# Patient Record
Sex: MALE | Race: WHITE | URBAN - METROPOLITAN AREA
[De-identification: names, ages, dates, MRNs, and addresses within clinical notes are randomized per-mention and may not be internally consistent; named-entity substitution may affect disease eponyms.]

---

## 2017-04-25 ENCOUNTER — ANESTHESIA EVENT (OUTPATIENT)
Dept: SURGERY | Facility: CLINIC | Age: 82
End: 2017-04-25
Payer: MEDICARE

## 2017-04-25 ENCOUNTER — HOSPITAL ENCOUNTER (OUTPATIENT)
Facility: CLINIC | Age: 82
Discharge: HOME | End: 2017-04-25
Attending: OPHTHALMOLOGY | Admitting: OPHTHALMOLOGY
Payer: MEDICARE

## 2017-04-25 ENCOUNTER — SURGERY (OUTPATIENT)
Age: 82
End: 2017-04-25

## 2017-04-25 ENCOUNTER — ANESTHESIA (OUTPATIENT)
Dept: SURGERY | Facility: CLINIC | Age: 82
End: 2017-04-25
Payer: MEDICARE

## 2017-04-25 VITALS
SYSTOLIC BLOOD PRESSURE: 105 MMHG | RESPIRATION RATE: 16 BRPM | DIASTOLIC BLOOD PRESSURE: 75 MMHG | OXYGEN SATURATION: 98 % | TEMPERATURE: 98.5 F

## 2017-04-25 PROCEDURE — 25000128 H RX IP 250 OP 636: Performed by: OPHTHALMOLOGY

## 2017-04-25 PROCEDURE — 25000132 ZZH RX MED GY IP 250 OP 250 PS 637: Performed by: OPHTHALMOLOGY

## 2017-04-25 PROCEDURE — 25000125 ZZHC RX 250: Performed by: ANESTHESIOLOGY

## 2017-04-25 PROCEDURE — 25800025 ZZH RX 258: Performed by: ANESTHESIOLOGY

## 2017-04-25 PROCEDURE — 36000101 ZZH EYE SURGERY LEVEL 3 1ST 30 MIN: Performed by: OPHTHALMOLOGY

## 2017-04-25 PROCEDURE — V2632 POST CHMBR INTRAOCULAR LENS: HCPCS | Performed by: OPHTHALMOLOGY

## 2017-04-25 PROCEDURE — 25000128 H RX IP 250 OP 636: Performed by: NURSE ANESTHETIST, CERTIFIED REGISTERED

## 2017-04-25 PROCEDURE — 71000028 ZZH EYE RECOVERY PHASE 2 EACH 15 MINS: Performed by: OPHTHALMOLOGY

## 2017-04-25 PROCEDURE — 25000125 ZZHC RX 250: Performed by: OPHTHALMOLOGY

## 2017-04-25 PROCEDURE — 37000009 ZZH ANESTHESIA TECHNICAL FEE, EACH ADDTL 15 MIN: Performed by: OPHTHALMOLOGY

## 2017-04-25 PROCEDURE — 40000170 ZZH STATISTIC PRE-PROCEDURE ASSESSMENT II: Performed by: OPHTHALMOLOGY

## 2017-04-25 PROCEDURE — 25000125 ZZHC RX 250: Performed by: NURSE ANESTHETIST, CERTIFIED REGISTERED

## 2017-04-25 PROCEDURE — 37000008 ZZH ANESTHESIA TECHNICAL FEE, 1ST 30 MIN: Performed by: OPHTHALMOLOGY

## 2017-04-25 PROCEDURE — 27210794 ZZH OR GENERAL SUPPLY STERILE: Performed by: OPHTHALMOLOGY

## 2017-04-25 PROCEDURE — 36000102 ZZH EYE SURGERY LEVEL 3 EA 15 ADDTL MIN: Performed by: OPHTHALMOLOGY

## 2017-04-25 PROCEDURE — 25000125 ZZHC RX 250

## 2017-04-25 DEVICE — EYE IMP IOL AMO PCL TECNIS ZCB00 21.0: Type: IMPLANTABLE DEVICE | Site: EYE | Status: FUNCTIONAL

## 2017-04-25 RX ORDER — NEOMYCIN SULFATE, POLYMYXIN B SULFATE, AND DEXAMETHASONE 3.5; 10000; 1 MG/G; [USP'U]/G; MG/G
OINTMENT OPHTHALMIC PRN
Status: DISCONTINUED | OUTPATIENT
Start: 2017-04-25 | End: 2017-04-25 | Stop reason: HOSPADM

## 2017-04-25 RX ORDER — MOXIFLOXACIN 5 MG/ML
1 SOLUTION/ DROPS OPHTHALMIC
Status: COMPLETED | OUTPATIENT
Start: 2017-04-25 | End: 2017-04-25

## 2017-04-25 RX ORDER — SODIUM CHLORIDE, SODIUM LACTATE, POTASSIUM CHLORIDE, CALCIUM CHLORIDE 600; 310; 30; 20 MG/100ML; MG/100ML; MG/100ML; MG/100ML
INJECTION, SOLUTION INTRAVENOUS CONTINUOUS
Status: CANCELLED | OUTPATIENT
Start: 2017-04-25

## 2017-04-25 RX ORDER — TROPICAMIDE 10 MG/ML
1 SOLUTION/ DROPS OPHTHALMIC
Status: COMPLETED | OUTPATIENT
Start: 2017-04-25 | End: 2017-04-25

## 2017-04-25 RX ORDER — SODIUM CHLORIDE, SODIUM LACTATE, POTASSIUM CHLORIDE, CALCIUM CHLORIDE 600; 310; 30; 20 MG/100ML; MG/100ML; MG/100ML; MG/100ML
500 INJECTION, SOLUTION INTRAVENOUS CONTINUOUS
Status: DISCONTINUED | OUTPATIENT
Start: 2017-04-25 | End: 2017-04-25 | Stop reason: HOSPADM

## 2017-04-25 RX ORDER — BALANCED SALT SOLUTION 6.4; .75; .48; .3; 3.9; 1.7 MG/ML; MG/ML; MG/ML; MG/ML; MG/ML; MG/ML
SOLUTION OPHTHALMIC PRN
Status: DISCONTINUED | OUTPATIENT
Start: 2017-04-25 | End: 2017-04-25 | Stop reason: HOSPADM

## 2017-04-25 RX ORDER — DICLOFENAC SODIUM 1 MG/ML
1 SOLUTION/ DROPS OPHTHALMIC
Status: COMPLETED | OUTPATIENT
Start: 2017-04-25 | End: 2017-04-25

## 2017-04-25 RX ORDER — LIDOCAINE HYDROCHLORIDE 10 MG/ML
INJECTION, SOLUTION EPIDURAL; INFILTRATION; INTRACAUDAL; PERINEURAL PRN
Status: DISCONTINUED | OUTPATIENT
Start: 2017-04-25 | End: 2017-04-25 | Stop reason: HOSPADM

## 2017-04-25 RX ORDER — PROPARACAINE HYDROCHLORIDE 5 MG/ML
1 SOLUTION/ DROPS OPHTHALMIC ONCE
Status: DISCONTINUED | OUTPATIENT
Start: 2017-04-25 | End: 2017-04-25 | Stop reason: HOSPADM

## 2017-04-25 RX ORDER — MOXIFLOXACIN 5 MG/ML
SOLUTION/ DROPS OPHTHALMIC PRN
Status: DISCONTINUED | OUTPATIENT
Start: 2017-04-25 | End: 2017-04-25 | Stop reason: HOSPADM

## 2017-04-25 RX ORDER — ONDANSETRON 2 MG/ML
INJECTION INTRAMUSCULAR; INTRAVENOUS PRN
Status: DISCONTINUED | OUTPATIENT
Start: 2017-04-25 | End: 2017-04-25

## 2017-04-25 RX ORDER — PHENYLEPHRINE HYDROCHLORIDE 25 MG/ML
1 SOLUTION/ DROPS OPHTHALMIC
Status: COMPLETED | OUTPATIENT
Start: 2017-04-25 | End: 2017-04-25

## 2017-04-25 RX ORDER — PROPARACAINE HYDROCHLORIDE 5 MG/ML
1 SOLUTION/ DROPS OPHTHALMIC ONCE
Status: COMPLETED | OUTPATIENT
Start: 2017-04-25 | End: 2017-04-25

## 2017-04-25 RX ORDER — FENTANYL CITRATE 50 UG/ML
INJECTION, SOLUTION INTRAMUSCULAR; INTRAVENOUS PRN
Status: DISCONTINUED | OUTPATIENT
Start: 2017-04-25 | End: 2017-04-25

## 2017-04-25 RX ADMIN — PROPARACAINE HYDROCHLORIDE 1 DROP: 5 SOLUTION/ DROPS OPHTHALMIC at 06:09

## 2017-04-25 RX ADMIN — LIDOCAINE HYDROCHLORIDE 0.5 ML: 35 GEL OPHTHALMIC at 07:25

## 2017-04-25 RX ADMIN — MOXIFLOXACIN HYDROCHLORIDE 1 DROP: 5 SOLUTION/ DROPS OPHTHALMIC at 08:05

## 2017-04-25 RX ADMIN — DICLOFENAC SODIUM 1 DROP: 1 SOLUTION/ DROPS OPHTHALMIC at 06:20

## 2017-04-25 RX ADMIN — DICLOFENAC SODIUM 1 DROP: 1 SOLUTION/ DROPS OPHTHALMIC at 06:15

## 2017-04-25 RX ADMIN — EPINEPHRINE 500 ML: 1 INJECTION, SOLUTION, CONCENTRATE INTRAVENOUS at 07:44

## 2017-04-25 RX ADMIN — BALANCED SALT SOLUTION 15 ML: 6.4; .75; .48; .3; 3.9; 1.7 SOLUTION OPHTHALMIC at 07:42

## 2017-04-25 RX ADMIN — DEXMEDETOMIDINE 4 MCG: 100 INJECTION, SOLUTION, CONCENTRATE INTRAVENOUS at 07:43

## 2017-04-25 RX ADMIN — TROPICAMIDE 1 DROP: 10 SOLUTION/ DROPS OPHTHALMIC at 06:20

## 2017-04-25 RX ADMIN — LIDOCAINE HYDROCHLORIDE 1 ML: 10 INJECTION, SOLUTION EPIDURAL; INFILTRATION; INTRACAUDAL; PERINEURAL at 06:21

## 2017-04-25 RX ADMIN — APRACLONIDINE HYDROCHLORIDE 1 DROP: 10 SOLUTION/ DROPS OPHTHALMIC at 08:06

## 2017-04-25 RX ADMIN — PHENYLEPHRINE HYDROCHLORIDE 1 DROP: 2.5 SOLUTION/ DROPS OPHTHALMIC at 06:20

## 2017-04-25 RX ADMIN — SODIUM CHLORIDE, POTASSIUM CHLORIDE, SODIUM LACTATE AND CALCIUM CHLORIDE 500 ML: 600; 310; 30; 20 INJECTION, SOLUTION INTRAVENOUS at 06:22

## 2017-04-25 RX ADMIN — MOXIFLOXACIN HYDROCHLORIDE 1 DROP: 5 SOLUTION/ DROPS OPHTHALMIC at 06:20

## 2017-04-25 RX ADMIN — FENTANYL CITRATE 50 MCG: 50 INJECTION, SOLUTION INTRAMUSCULAR; INTRAVENOUS at 07:33

## 2017-04-25 RX ADMIN — DICLOFENAC SODIUM 1 DROP: 1 SOLUTION/ DROPS OPHTHALMIC at 06:09

## 2017-04-25 RX ADMIN — TROPICAMIDE 1 DROP: 10 SOLUTION/ DROPS OPHTHALMIC at 06:09

## 2017-04-25 RX ADMIN — PHENYLEPHRINE HYDROCHLORIDE 1 DROP: 2.5 SOLUTION/ DROPS OPHTHALMIC at 06:15

## 2017-04-25 RX ADMIN — ONDANSETRON 4 MG: 2 INJECTION INTRAMUSCULAR; INTRAVENOUS at 07:42

## 2017-04-25 RX ADMIN — MOXIFLOXACIN HYDROCHLORIDE 1 DROP: 5 SOLUTION/ DROPS OPHTHALMIC at 06:09

## 2017-04-25 RX ADMIN — PHENYLEPHRINE HYDROCHLORIDE 1 DROP: 2.5 SOLUTION/ DROPS OPHTHALMIC at 06:09

## 2017-04-25 RX ADMIN — NEOMYCIN SULFATE, POLYMYXIN B SULFATE, AND DEXAMETHASONE 1 G: 3.5; 10000; 1 OINTMENT OPHTHALMIC at 08:06

## 2017-04-25 RX ADMIN — MOXIFLOXACIN HYDROCHLORIDE 1 DROP: 5 SOLUTION/ DROPS OPHTHALMIC at 06:15

## 2017-04-25 RX ADMIN — TROPICAMIDE 1 DROP: 10 SOLUTION/ DROPS OPHTHALMIC at 06:15

## 2017-04-25 RX ADMIN — FENTANYL CITRATE 25 MCG: 50 INJECTION, SOLUTION INTRAMUSCULAR; INTRAVENOUS at 07:43

## 2017-04-25 RX ADMIN — LIDOCAINE HYDROCHLORIDE 1 ML: 10 INJECTION, SOLUTION EPIDURAL; INFILTRATION; INTRACAUDAL; PERINEURAL at 07:44

## 2017-04-25 RX ADMIN — DEXMEDETOMIDINE 8 MCG: 100 INJECTION, SOLUTION, CONCENTRATE INTRAVENOUS at 07:37

## 2017-04-25 RX ADMIN — DEXMEDETOMIDINE 8 MCG: 100 INJECTION, SOLUTION, CONCENTRATE INTRAVENOUS at 07:33

## 2017-04-25 RX ADMIN — SODIUM CHONDROITIN SULFATE / SODIUM HYALURONATE 1 ML: 0.55-0.5 INJECTION INTRAOCULAR at 07:44

## 2017-04-25 ASSESSMENT — COPD QUESTIONNAIRES: COPD: 0

## 2017-04-25 ASSESSMENT — ENCOUNTER SYMPTOMS: SEIZURES: 0

## 2017-04-25 NOTE — IP AVS SNAPSHOT
[facility]    [address]    [place] [address]    Phone:  [phone]    Fax:  [phone]                                       After Visit Summary   4/25/2017    Saul Chavarria III    MRN: [id number]           After Visit Summary Signature Page     I have received my discharge instructions, and my questions have been answered. I have discussed any challenges I see with this plan with the nurse or doctor.    ..........................................................................................................................................  Patient/Patient Representative Signature      ..........................................................................................................................................  Patient Representative Print Name and Relationship to Patient    ..................................................               ................................................  Date                                            Time    ..........................................................................................................................................  Reviewed by Signature/Title    ...................................................              ..............................................  Date                                                            Time

## 2017-04-25 NOTE — IP AVS SNAPSHOT
MRN:[id number]                      After Visit Summary   4/25/2017    Saul Chavarria III    MRN: [id number]           Thank you!     Thank you for choosing [facility] for your care. Our goal is always to provide you with excellent care. Hearing back from our patients is one way we can continue to improve our services. Please take a few minutes to complete the written survey that you may receive in the mail after you visit with us. Thank you!        Patient Information     Date Of Birth          1935        About your hospital stay     You were admitted on:  April 25, 2017 You last received care in the:  [facility]    You were discharged on:  April 25, 2017       Who to Call     For medical emergencies, please call 911.  For non-urgent questions about your medical care, please call your primary care provider or clinic, [phone]  For questions related to your surgery, please call your surgery clinic        Attending Provider     Provider Crow Álvarez MD Ophthalmology       Primary Care Provider Office Phone # Fax #    [facility] [phone] [address] [facility] [address]        Further instructions from your care team        POST-OPERATIVE CARE FOLLOWING CATARACT SURGERY    CROW CALDERON M.D.  [facility]  [phone]        If you have a gauze patch and plastic eye shield on, please do not remove it until it is removed by your physician at your first post-operative visit.  You will not start your drops until after having your patch removed by your physician unless instructed otherwise.      Wear the eye shield when sleeping for protection for one week after surgery.      Do not rub the operated eye.      Light sensitivity may be noticed. Sunglasses may be worn for comfort.      Some discomfort and irritation may be noticed. Acetaminophen (Tylenol) or Ibuprofen (Advil) may be taken for  discomfort.      Avoid bending over, strenuous activity or heavy lifting (30 pounds or more) for one week.      No make-up or creams/lotions around the eye for two weeks.      You may wash your hair, bathe or shower, but keep the operated eye closed while doing so.  No swimming for three weeks.      Use medication exactly as prescribed by your doctor.  You may restart your regular home medications.      Bring all materials and medications to the clinic on your first post-operative visit.      Call the doctor s office if any of the following should occur:  -  any sudden vision change  -  increased amount of floaters (black spots in front of vision) or flashes  -  nausea, severe headache, or eye pain          -  or signs of infection (pus, increasing redness or tenderness)          [facility] Discharge  Instructions  Anesthesia ([facility] Center)   Adult Discharge Instructions    For 24 hours after surgery    1. Get plenty of rest.  Make arrangements to have a responsible adult stay with you for at least 6 hours after you leave the hospital.  2. Do not drive or use heavy equipment for 24 hours.    3. Do not drink alcohol for 24 hours.  4. Do not sign legal documents or make important decisions for 24 hours.  5. Avoid strenuous or risky activities. You may feel lightheaded.  If so, sit for a few minutes before standing.  Have someone help you get up.   6. Conscious sedation patients may resume a regular diet..  7. Any questions of medical nature, call your physician.    Pending Results     No orders found from 4/23/2017 to 4/26/2017.            Admission Information     Date & Time Provider Department Dept. Phone    4/25/2017 Wanda Jerez MD [facility] [place] [phone]      Your Vitals Were     Blood Pressure Temperature Respirations Pulse Oximetry          127/85 98.5  F (36.9  C) (Temporal) 16 98%        [employer] Information     [employer] lets you send messages to  "your doctor, view your test results, renew your prescriptions, schedule appointments and more. To sign up, go to [email or web address] . Click on \"Log in\" on the left side of the screen, which will take you to the Welcome page. Then click on \"Sign up Now\" on the right side of the page.     You will be asked to enter the access code listed below, as well as some personal information. Please follow the directions to create your username and password.     Your access code is: [email or web address]  Expires: 2017  8:11 AM     Your access code will  in 90 days. If you need help or a new code, please call your [place] clinic or [phone].        Care EveryWhere ID     This is your Care EveryWhere ID. This could be used by other organizations to access your [place] medical records  [id number]           Review of your medicines      UNREVIEWED medicines. Ask your doctor about these medicines        Dose / Directions    LISINOPRIL PO        Dose:  10 mg   Take 10 mg by mouth daily   Refills:  0                Protect others around you: Learn how to safely use, store and throw away your medicines at www.disposemymeds.org.             Medication List: This is a list of all your medications and when to take them. Check marks below indicate your daily home schedule. Keep this list as a reference.      Medications           Morning Afternoon Evening Bedtime As Needed    LISINOPRIL PO   Take 10 mg by mouth daily                                  "

## 2017-04-25 NOTE — ANESTHESIA CARE TRANSFER NOTE
Patient: Saul Chavarrai III    Procedure(s):  RIGHT EYE PHACOEMULSIFICATION CLEAR CORNEA WITH STANDARD INTRAOCULAR LENS IMPLANT - Wound Class: I-Clean    Diagnosis: CATARACT  Diagnosis Additional Information: No value filed.    Anesthesia Type:   MAC     Note:  Airway :Room Air  Patient transferred to:PACU    Transferred to [facility] recovery room in recliner with armrests up, spontaneous respirations, O2 saturation >93% with oxygen via room air. All monitors and alarms on and functioning, clinically stable vital signs. Report given to recovery RN and questions answered. Patient alert and following verbal directions.    Electronically Signed By: ARUN Burciaga CRNA  April 25, 2017  8:10 AM

## 2017-04-25 NOTE — OP NOTE
PREOPERATIVE DIAGNOSIS:  Visually significant cataract, Right eye.       POSTOPERATIVE DIAGNOSIS:  Visually significant cataract, Right eye.       PROCEDURE:  Phacoemulsification with intraocular lens implant, Right eye.       ANESTHESIA:  Topical with monitored anesthesia care.       COMPLICATIONS:  None.       INDICATIONS:  Saul Chavarria III is a 81 year old patient complaining of gradual painless loss of vision in the Right  Eye over the last several months.  This interferes with his activities of daily living.  On examination he was found to have best-corrected visual acuity of 20/40 with a nuclear sclerotic and cortical cataract. After careful discussion regarding the risks, benefits and alternatives of  cataract surgery with intraocular lens implant, the patient elected to proceed, and informed written consent was obtained.        PROCEDURE:  On the day of surgery, the patient was identified in the preoperative waiting area.  An interval history was obtained and was unremarkable for change.  All sources indicated that the Right eye was the correct surgical site and a isha placed above the Right eye.  A drop of proparacaine followed by Betadine and lidocaine jelly were placed on the ocular surface.         The patient was escorted to the operating room where monitored intravenous sedation was administered.  The eye was prepped and draped in the usual sterile fashion for ophthalmic surgery and a lid speculum placed.  After confirming adequate anesthesia, a paracentesis port was made at the 10 o'clock position and used to instill intracameral lidocaine into the anterior chamber, followed by viscoelastic.  A temporal clear corneal wound was fashioned first with a crescent blade followed by a 2.5 mm keratome.  An anterior capsulorrhexis was made by raising a flap with a cystotome and then carrying out the flap in a continuous curvilinear fashion using Utrata forceps.  Hydrodissection was accomplished with  balanced salt solution on a flat tipped cannula.  Phaco-emulsification was done in a modified stop and chop technique, removing the nucleus without complication.  Residual cortex was removed with the automated irrigation and aspiration handpiece.  The back of the polymer tip was used to gently polish the posterior capsule.  The capsular bag was then reinflated with Provisc and a 21.0 diopter TIFFANI lens model ZCBOO was placed in the capsular bag using the Kilgore injector.    The residual viscoelastic was removed with the automated irrigation and aspiration handpiece.  The lens was well centered and the pupil round. ReSure tissue adhesive was used to re-oppose the edges of the clear corneal wound. All wounds were checked and found to be watertight. Vigamox solution was instilled into the anterior chamber via the paracentesis port. A drop of Betadine, followed by Iopodine solution  and Maxitrol ointment were placed on the ocular surface and the eye patched and shielded.  The patient was escorted to the recovery room in good condition having tolerated the procedure well.  There were no complications.      Implant Name Type Inv. Item Serial No.  Lot No. LRB No. Used   EYE IMP IOL TIFFANI PCL TECNIS ZCB00 21.0 Lens/Eye Implant EYE IMP IOL TIFFANI PCL TECNIS ZCB00 21.0 [id number] [employer]   Right 1            CROW CALDERON MD

## 2017-04-25 NOTE — DISCHARGE INSTRUCTIONS
POST-OPERATIVE CARE FOLLOWING CATARACT SURGERY    CROW CALDERON M.D.  [facility]  [phone]        If you have a gauze patch and plastic eye shield on, please do not remove it until it is removed by your physician at your first post-operative visit.  You will not start your drops until after having your patch removed by your physician unless instructed otherwise.      Wear the eye shield when sleeping for protection for one week after surgery.      Do not rub the operated eye.      Light sensitivity may be noticed. Sunglasses may be worn for comfort.      Some discomfort and irritation may be noticed. Acetaminophen (Tylenol) or Ibuprofen (Advil) may be taken for discomfort.      Avoid bending over, strenuous activity or heavy lifting (30 pounds or more) for one week.      No make-up or creams/lotions around the eye for two weeks.      You may wash your hair, bathe or shower, but keep the operated eye closed while doing so.  No swimming for three weeks.      Use medication exactly as prescribed by your doctor.  You may restart your regular home medications.      Bring all materials and medications to the clinic on your first post-operative visit.      Call the doctor s office if any of the following should occur:  -  any sudden vision change  -  increased amount of floaters (black spots in front of vision) or flashes  -  nausea, severe headache, or eye pain          -  or signs of infection (pus, increasing redness or tenderness)          [facility] Discharge  Instructions  Anesthesia ([facility] Center)   Adult Discharge Instructions    For 24 hours after surgery    1. Get plenty of rest.  Make arrangements to have a responsible adult stay with you for at least 6 hours after you leave the hospital.  2. Do not drive or use heavy equipment for 24 hours.    3. Do not drink alcohol for 24 hours.  4. Do not sign legal documents or make important decisions for 24  hours.  5. Avoid strenuous or risky activities. You may feel lightheaded.  If so, sit for a few minutes before standing.  Have someone help you get up.   6. Conscious sedation patients may resume a regular diet..  7. Any questions of medical nature, call your physician.

## 2017-05-24 ENCOUNTER — OFFICE VISIT (OUTPATIENT)
Dept: SURGERY | Facility: CLINIC | Age: 82
End: 2017-05-24
Payer: MEDICARE

## 2017-05-24 VITALS
DIASTOLIC BLOOD PRESSURE: 97 MMHG | HEIGHT: 71 IN | SYSTOLIC BLOOD PRESSURE: 169 MMHG | WEIGHT: 200 LBS | BODY MASS INDEX: 28 KG/M2 | HEART RATE: 71 BPM

## 2017-05-24 DIAGNOSIS — K40.90 RIGHT INGUINAL HERNIA: Primary | ICD-10-CM

## 2017-05-24 PROCEDURE — 99204 OFFICE O/P NEW MOD 45 MIN: CPT | Performed by: SURGERY

## 2017-05-24 NOTE — LETTER
"      May 24, 2017    [facility]  Surgery Consultation    RE:  Saul Chavarria III-:  35    CONSULTATION REQUESTED BY:  Lina Tijerina [phone]     HPI: Patient is a 81 year old male who presents with right inguinal hernia. Hernia has been present for last 6 months. Pain is primarily located in the RLQ. Patient states pain is worse with excercise heavy lifting standing. Pain is rated as mild. Symptoms are improved with rest lying flat. Hernia has not been incarcerated. Patient has not had any symptoms of bowel obstruction. Patient denies fevers, chills, nausea, vomiting, SOB, chest pain,.     PMH:   has a past medical history of Anemia; Hypertension; Prostate cancer (H); Rosacea; and Sleep apnea.  PSH:    has a past surgical history that includes colonoscopy; Abdomen surgery; ostatectomy perineal; and Phacoemulsification clear cornea with standard intraocular lens implant (Right, 2017).  Social History:   reports that he quit smoking about 59 years ago. His smoking use included Cigarettes. He does not have any smokeless tobacco history on file. He reports that he drinks alcohol. He reports that he does not use illicit drugs.  Family History:  family history is not on file.  Medications/Allergies: Home medications and allergies reviewed.     ROS:  The 10 point Review of Systems is negative other than noted in the HPI.     Physical Exam:  BP (!) 169/97  Pulse 71  Ht 5' 11\" (1.803 m)  Wt 200 lb (90.7 kg)  BMI 27.89 kg/m2  GENERAL: Generally appears well.  Psych: Alert and Oriented.  Normal affect  Eyes: Sclera clear  Respiratory:  Lungs clear to ausculation bilaterally with good air excursion  Cardiovascular:  Regular Rate and Rhythm with no murmurs gallops or rubs, normal peripheral pulses  GI: Abdomen Non Distended Non-Tender  No hernias palpated..  Groin- I examined the patient in both the standing and supine positions. Right Groin- Moderate sized inguinal hernia. Left " Groin- No hernia Palpated. No scrotal or testicle abnormalities.  Lymphatic/Hematologic/Immune:  No femoral or cervical lymphadenopathy.  Integumentary:  No rashes  Neurological: grossly intact      All new lab and imaging data was reviewed.      Impression and Plan:  Patient is a 81 year old male with Right inguinal hernia     PLAN: Outpatient  Repair to be scheduled at his convenience  I discussed the pathophysiology of hernias and options for repair including laparoscopic VS open.  The risks associated with the procedure including, but not limited to, recurrence, nerve entrapment or injury, persistence of pain, injury to the bowel/bladder, infertility, hematoma, mesh migration, mesh infection, MI, and PE were discussed with the patient. He indicated understanding of the discussion, asked appropriate questions, and provided consent. Signs and symptoms of incarceration were discussed. If these develop in the interim, he promises to call or go straight to the ER. I have provided the patient with an information pamphlet.      Thank you very much for this consult.     Ravinder Gomez M.D.  [facility]  [phone]

## 2017-05-24 NOTE — MR AVS SNAPSHOT
"              After Visit Summary   2017    Saul Chavarria III    MRN: [id number]           Patient Information     Date Of Birth          1935        Visit Information        Provider Department      2017 2:00 PM Ravinder Gomez MD [facility] [place] [facility]       Follow-ups after your visit        Who to contact     If you have questions or need follow up information about today's clinic visit or your schedule please contact [facility] [place] directly at [phone].  Normal or non-critical lab and imaging results will be communicated to you by [employer], letter or phone within 4 business days after the clinic has received the results. If you do not hear from us within 7 days, please contact the clinic through [employer] or phone. If you have a critical or abnormal lab result, we will notify you by phone as soon as possible.  Submit refill requests through [email or web address] or call your pharmacy and they will forward the refill request to us. Please allow 3 business days for your refill to be completed.          Additional Information About Your Visit        MyChart Information     [email or web address] lets you send messages to your doctor, view your test results, renew your prescriptions, schedule appointments and more. To sign up, go to [email or web address] . Click on \"Log in\" on the left side of the screen, which will take you to the Welcome page. Then click on \"Sign up Now\" on the right side of the page.     You will be asked to enter the access code listed below, as well as some personal information. Please follow the directions to create your username and password.     Your access code is: [email or web address]  Expires: 2017  8:11 AM     Your access code will  in 90 days. If you need help or a new code, please call your [place] clinic or [phone].        Care EveryWhere ID     This is your Care EveryWhere ID. This could be used by other " "organizations to access your [place] medical records  [id number]        Your Vitals Were     Pulse Height BMI (Body Mass Index)             71 5' 11\" (1.803 m) 27.89 kg/m2          Blood Pressure from Last 3 Encounters:   05/24/17 (!) 169/97   04/25/17 105/75    Weight from Last 3 Encounters:   05/24/17 200 lb (90.7 kg)              Today, you had the following     No orders found for display       Primary Care Provider Office Phone # Fax #    Lina Tijerina [phone]       [employer] [address]  [facility] [address]        Thank you!     Thank you for choosing [facility] [place]  for your care. Our goal is always to provide you with excellent care. Hearing back from our patients is one way we can continue to improve our services. Please take a few minutes to complete the written survey that you may receive in the mail after your visit with us. Thank you!             Your Updated Medication List - Protect others around you: Learn how to safely use, store and throw away your medicines at www.disposemymeds.org.          This list is accurate as of: 5/24/17  2:50 PM.  Always use your most recent med list.                   Brand Name Dispense Instructions for use    LISINOPRIL PO      Take 10 mg by mouth daily         "

## 2017-05-31 NOTE — PROGRESS NOTES
"[facility]  Surgery Consultation    CONSULTATION REQUESTED BY:  Lina Tijerina [phone]    HPI: Patient is a 81 year old male who presents with right inguinal hernia. Hernia has been present for last 6 months. Pain is primarily located in the RLQ. Patient states pain is worse with excercise heavy lifting standing. Pain is rated as mild. Symptoms are improved with rest lying flat. Hernia has not been incarcerated. Patient has not had any symptoms of bowel obstruction. Patient denies fevers, chills, nausea, vomiting, SOB, chest pain,.    PMH:   has a past medical history of Anemia; Hypertension; Prostate cancer (H); Rosacea; and Sleep apnea.  PSH:    has a past surgical history that includes colonoscopy; Abdomen surgery; ostatectomy perineal; and Phacoemulsification clear cornea with standard intraocular lens implant (Right, 4/25/2017).  Social History:   reports that he quit smoking about 59 years ago. His smoking use included Cigarettes. He does not have any smokeless tobacco history on file. He reports that he drinks alcohol. He reports that he does not use illicit drugs.  Family History:  family history is not on file.  Medications/Allergies: Home medications and allergies reviewed.    ROS:  The 10 point Review of Systems is negative other than noted in the HPI.    Physical Exam:  BP (!) 169/97  Pulse 71  Ht 5' 11\" (1.803 m)  Wt 200 lb (90.7 kg)  BMI 27.89 kg/m2  GENERAL: Generally appears well.  Psych: Alert and Oriented.  Normal affect  Eyes: Sclera clear  Respiratory:  Lungs clear to ausculation bilaterally with good air excursion  Cardiovascular:  Regular Rate and Rhythm with no murmurs gallops or rubs, normal peripheral pulses  GI: Abdomen Non Distended Non-Tender  No hernias palpated..  Groin- I examined the patient in both the standing and supine positions. Right Groin- Moderate sized inguinal hernia. Left Groin- No hernia Palpated. No scrotal or testicle " abnormalities.  Lymphatic/Hematologic/Immune:  No femoral or cervical lymphadenopathy.  Integumentary:  No rashes  Neurological: grossly intact     All new lab and imaging data was reviewed.     Impression and Plan:  Patient is a 81 year old male with Right inguinal hernia    PLAN: Outpatient  Repair to be scheduled at his convenience  I discussed the pathophysiology of hernias and options for repair including laparoscopic VS open.  The risks associated with the procedure including, but not limited to, recurrence, nerve entrapment or injury, persistence of pain, injury to the bowel/bladder, infertility, hematoma, mesh migration, mesh infection, MI, and PE were discussed with the patient. He indicated understanding of the discussion, asked appropriate questions, and provided consent. Signs and symptoms of incarceration were discussed. If these develop in the interim, he promises to call or go straight to the ER. I have provided the patient with an information pamphlet.      Thank you very much for this consult.    Ravinder Gomez M.D.  [facility]  [phone]    Please route or send letter to:  Primary Care Provider (PCP) and Referring Provider

## 2017-06-27 ENCOUNTER — HOSPITAL ENCOUNTER (OUTPATIENT)
Facility: CLINIC | Age: 82
Discharge: HOME | End: 2017-06-27
Attending: SURGERY | Admitting: SURGERY
Payer: MEDICARE

## 2017-06-27 ENCOUNTER — ANESTHESIA (OUTPATIENT)
Dept: SURGERY | Facility: CLINIC | Age: 82
End: 2017-06-27
Payer: MEDICARE

## 2017-06-27 ENCOUNTER — ANESTHESIA EVENT (OUTPATIENT)
Dept: SURGERY | Facility: CLINIC | Age: 82
End: 2017-06-27
Payer: MEDICARE

## 2017-06-27 ENCOUNTER — SURGERY (OUTPATIENT)
Age: 82
End: 2017-06-27

## 2017-06-27 ENCOUNTER — APPOINTMENT (OUTPATIENT)
Dept: SURGERY | Facility: PHYSICIAN GROUP | Age: 82
End: 2017-06-27
Payer: MEDICARE

## 2017-06-27 VITALS
OXYGEN SATURATION: 98 % | TEMPERATURE: 97.8 F | HEIGHT: 70 IN | BODY MASS INDEX: 28.06 KG/M2 | DIASTOLIC BLOOD PRESSURE: 60 MMHG | RESPIRATION RATE: 18 BRPM | SYSTOLIC BLOOD PRESSURE: 116 MMHG | WEIGHT: 196 LBS

## 2017-06-27 DIAGNOSIS — K40.90 RIGHT INGUINAL HERNIA: Primary | ICD-10-CM

## 2017-06-27 DIAGNOSIS — G89.18 ACUTE POST-OPERATIVE PAIN: ICD-10-CM

## 2017-06-27 PROCEDURE — 25000128 H RX IP 250 OP 636: Performed by: SURGERY

## 2017-06-27 PROCEDURE — 49505 PRP I/HERN INIT REDUC >5 YR: CPT | Performed by: SURGERY

## 2017-06-27 PROCEDURE — 71000013 ZZH RECOVERY PHASE 1 LEVEL 1 EA ADDTL HR: Performed by: SURGERY

## 2017-06-27 PROCEDURE — 27210995 ZZH RX 272: Performed by: SURGERY

## 2017-06-27 PROCEDURE — 36000052 ZZH SURGERY LEVEL 2 EA 15 ADDTL MIN: Performed by: SURGERY

## 2017-06-27 PROCEDURE — 40000170 ZZH STATISTIC PRE-PROCEDURE ASSESSMENT II: Performed by: SURGERY

## 2017-06-27 PROCEDURE — 49505 PRP I/HERN INIT REDUC >5 YR: CPT | Mod: AS | Performed by: PHYSICIAN ASSISTANT

## 2017-06-27 PROCEDURE — 25000566 ZZH SEVOFLURANE, EA 15 MIN: Performed by: SURGERY

## 2017-06-27 PROCEDURE — 36000050 ZZH SURGERY LEVEL 2 1ST 30 MIN: Performed by: SURGERY

## 2017-06-27 PROCEDURE — 71000027 ZZH RECOVERY PHASE 2 EACH 15 MINS: Performed by: SURGERY

## 2017-06-27 PROCEDURE — 25000128 H RX IP 250 OP 636: Performed by: NURSE ANESTHETIST, CERTIFIED REGISTERED

## 2017-06-27 PROCEDURE — C1781 MESH (IMPLANTABLE): HCPCS | Performed by: SURGERY

## 2017-06-27 PROCEDURE — 25000125 ZZHC RX 250: Performed by: NURSE ANESTHETIST, CERTIFIED REGISTERED

## 2017-06-27 PROCEDURE — 71000012 ZZH RECOVERY PHASE 1 LEVEL 1 FIRST HR: Performed by: SURGERY

## 2017-06-27 PROCEDURE — 37000008 ZZH ANESTHESIA TECHNICAL FEE, 1ST 30 MIN: Performed by: SURGERY

## 2017-06-27 PROCEDURE — 37000009 ZZH ANESTHESIA TECHNICAL FEE, EACH ADDTL 15 MIN: Performed by: SURGERY

## 2017-06-27 PROCEDURE — 25000125 ZZHC RX 250: Performed by: SURGERY

## 2017-06-27 PROCEDURE — 27210794 ZZH OR GENERAL SUPPLY STERILE: Performed by: SURGERY

## 2017-06-27 DEVICE — MESH ULTRAPRO HERNIA 2.4X4.7" LARGE UHSL: Type: IMPLANTABLE DEVICE | Site: INGUINAL | Status: FUNCTIONAL

## 2017-06-27 RX ORDER — FENTANYL CITRATE 50 UG/ML
INJECTION, SOLUTION INTRAMUSCULAR; INTRAVENOUS PRN
Status: DISCONTINUED | OUTPATIENT
Start: 2017-06-27 | End: 2017-06-27

## 2017-06-27 RX ORDER — EPHEDRINE SULFATE 50 MG/ML
INJECTION, SOLUTION INTRAMUSCULAR; INTRAVENOUS; SUBCUTANEOUS PRN
Status: DISCONTINUED | OUTPATIENT
Start: 2017-06-27 | End: 2017-06-27

## 2017-06-27 RX ORDER — SODIUM CHLORIDE, SODIUM LACTATE, POTASSIUM CHLORIDE, CALCIUM CHLORIDE 600; 310; 30; 20 MG/100ML; MG/100ML; MG/100ML; MG/100ML
INJECTION, SOLUTION INTRAVENOUS CONTINUOUS
Status: DISCONTINUED | OUTPATIENT
Start: 2017-06-27 | End: 2017-06-27 | Stop reason: HOSPADM

## 2017-06-27 RX ORDER — FENTANYL CITRATE 0.05 MG/ML
25-50 INJECTION, SOLUTION INTRAMUSCULAR; INTRAVENOUS EVERY 5 MIN PRN
Status: DISCONTINUED | OUTPATIENT
Start: 2017-06-27 | End: 2017-06-27 | Stop reason: HOSPADM

## 2017-06-27 RX ORDER — NALOXONE HYDROCHLORIDE 0.4 MG/ML
.1-.4 INJECTION, SOLUTION INTRAMUSCULAR; INTRAVENOUS; SUBCUTANEOUS
Status: DISCONTINUED | OUTPATIENT
Start: 2017-06-27 | End: 2017-06-27 | Stop reason: HOSPADM

## 2017-06-27 RX ORDER — LABETALOL HYDROCHLORIDE 5 MG/ML
10 INJECTION, SOLUTION INTRAVENOUS
Status: DISCONTINUED | OUTPATIENT
Start: 2017-06-27 | End: 2017-06-27 | Stop reason: HOSPADM

## 2017-06-27 RX ORDER — PROPOFOL 10 MG/ML
INJECTION, EMULSION INTRAVENOUS PRN
Status: DISCONTINUED | OUTPATIENT
Start: 2017-06-27 | End: 2017-06-27

## 2017-06-27 RX ORDER — FENTANYL CITRATE 0.05 MG/ML
25-50 INJECTION, SOLUTION INTRAMUSCULAR; INTRAVENOUS
Status: DISCONTINUED | OUTPATIENT
Start: 2017-06-27 | End: 2017-06-27 | Stop reason: HOSPADM

## 2017-06-27 RX ORDER — HYDROCODONE BITARTRATE AND ACETAMINOPHEN 5; 325 MG/1; MG/1
1-2 TABLET ORAL
Status: DISCONTINUED | OUTPATIENT
Start: 2017-06-27 | End: 2017-06-27 | Stop reason: HOSPADM

## 2017-06-27 RX ORDER — MEPERIDINE HYDROCHLORIDE 25 MG/ML
12.5 INJECTION INTRAMUSCULAR; INTRAVENOUS; SUBCUTANEOUS
Status: DISCONTINUED | OUTPATIENT
Start: 2017-06-27 | End: 2017-06-27 | Stop reason: HOSPADM

## 2017-06-27 RX ORDER — SODIUM CHLORIDE, SODIUM LACTATE, POTASSIUM CHLORIDE, CALCIUM CHLORIDE 600; 310; 30; 20 MG/100ML; MG/100ML; MG/100ML; MG/100ML
INJECTION, SOLUTION INTRAVENOUS CONTINUOUS PRN
Status: DISCONTINUED | OUTPATIENT
Start: 2017-06-27 | End: 2017-06-27

## 2017-06-27 RX ORDER — CEFAZOLIN SODIUM 2 G/100ML
2 INJECTION, SOLUTION INTRAVENOUS
Status: COMPLETED | OUTPATIENT
Start: 2017-06-27 | End: 2017-06-27

## 2017-06-27 RX ORDER — HYDROCODONE BITARTRATE AND ACETAMINOPHEN 5; 325 MG/1; MG/1
1-2 TABLET ORAL EVERY 4 HOURS PRN
Qty: 25 TABLET | Refills: 0 | Status: SHIPPED | OUTPATIENT
Start: 2017-06-27 | End: 2017-07-11

## 2017-06-27 RX ORDER — ONDANSETRON 4 MG/1
4 TABLET, ORALLY DISINTEGRATING ORAL EVERY 30 MIN PRN
Status: DISCONTINUED | OUTPATIENT
Start: 2017-06-27 | End: 2017-06-27 | Stop reason: HOSPADM

## 2017-06-27 RX ORDER — MAGNESIUM HYDROXIDE 1200 MG/15ML
LIQUID ORAL PRN
Status: DISCONTINUED | OUTPATIENT
Start: 2017-06-27 | End: 2017-06-27 | Stop reason: HOSPADM

## 2017-06-27 RX ORDER — ONDANSETRON 2 MG/ML
4 INJECTION INTRAMUSCULAR; INTRAVENOUS EVERY 30 MIN PRN
Status: DISCONTINUED | OUTPATIENT
Start: 2017-06-27 | End: 2017-06-27 | Stop reason: HOSPADM

## 2017-06-27 RX ORDER — LIDOCAINE HYDROCHLORIDE 20 MG/ML
INJECTION, SOLUTION INFILTRATION; PERINEURAL PRN
Status: DISCONTINUED | OUTPATIENT
Start: 2017-06-27 | End: 2017-06-27

## 2017-06-27 RX ORDER — CEFAZOLIN SODIUM 1 G/3ML
1 INJECTION, POWDER, FOR SOLUTION INTRAMUSCULAR; INTRAVENOUS SEE ADMIN INSTRUCTIONS
Status: DISCONTINUED | OUTPATIENT
Start: 2017-06-27 | End: 2017-06-27 | Stop reason: HOSPADM

## 2017-06-27 RX ORDER — ONDANSETRON 2 MG/ML
INJECTION INTRAMUSCULAR; INTRAVENOUS PRN
Status: DISCONTINUED | OUTPATIENT
Start: 2017-06-27 | End: 2017-06-27

## 2017-06-27 RX ORDER — BUPIVACAINE HYDROCHLORIDE AND EPINEPHRINE 5; 5 MG/ML; UG/ML
INJECTION, SOLUTION PERINEURAL PRN
Status: DISCONTINUED | OUTPATIENT
Start: 2017-06-27 | End: 2017-06-27 | Stop reason: HOSPADM

## 2017-06-27 RX ADMIN — SODIUM CHLORIDE, POTASSIUM CHLORIDE, SODIUM LACTATE AND CALCIUM CHLORIDE: 600; 310; 30; 20 INJECTION, SOLUTION INTRAVENOUS at 10:48

## 2017-06-27 RX ADMIN — BUPIVACAINE HYDROCHLORIDE AND EPINEPHRINE BITARTRATE 30 ML: 5; .005 INJECTION, SOLUTION PERINEURAL at 11:24

## 2017-06-27 RX ADMIN — Medication 5 MG: at 11:19

## 2017-06-27 RX ADMIN — PROPOFOL 50 MG: 10 INJECTION, EMULSION INTRAVENOUS at 11:03

## 2017-06-27 RX ADMIN — Medication 5 MG: at 11:09

## 2017-06-27 RX ADMIN — LIDOCAINE HYDROCHLORIDE 100 MG: 20 INJECTION, SOLUTION INFILTRATION; PERINEURAL at 10:50

## 2017-06-27 RX ADMIN — PHENYLEPHRINE HYDROCHLORIDE 100 MCG: 10 INJECTION, SOLUTION INTRAMUSCULAR; INTRAVENOUS; SUBCUTANEOUS at 11:30

## 2017-06-27 RX ADMIN — PROPOFOL 150 MG: 10 INJECTION, EMULSION INTRAVENOUS at 10:50

## 2017-06-27 RX ADMIN — ONDANSETRON 4 MG: 2 INJECTION INTRAMUSCULAR; INTRAVENOUS at 11:32

## 2017-06-27 RX ADMIN — FENTANYL CITRATE 25 MCG: 50 INJECTION, SOLUTION INTRAMUSCULAR; INTRAVENOUS at 10:50

## 2017-06-27 RX ADMIN — SODIUM CHLORIDE 1000 ML: 0.9 IRRIGANT IRRIGATION at 11:24

## 2017-06-27 RX ADMIN — FENTANYL CITRATE 75 MCG: 50 INJECTION, SOLUTION INTRAMUSCULAR; INTRAVENOUS at 11:02

## 2017-06-27 RX ADMIN — Medication 5 MG: at 11:13

## 2017-06-27 RX ADMIN — CEFAZOLIN SODIUM 2 G: 2 INJECTION, SOLUTION INTRAVENOUS at 10:55

## 2017-06-27 RX ADMIN — PHENYLEPHRINE HYDROCHLORIDE 100 MCG: 10 INJECTION, SOLUTION INTRAMUSCULAR; INTRAVENOUS; SUBCUTANEOUS at 11:13

## 2017-06-27 RX ADMIN — PHENYLEPHRINE HYDROCHLORIDE 100 MCG: 10 INJECTION, SOLUTION INTRAMUSCULAR; INTRAVENOUS; SUBCUTANEOUS at 11:19

## 2017-06-27 RX ADMIN — Medication 5 MG: at 11:00

## 2017-06-27 RX ADMIN — Medication 5 MG: at 10:58

## 2017-06-27 ASSESSMENT — LIFESTYLE VARIABLES: TOBACCO_USE: 1

## 2017-06-27 NOTE — OP NOTE
PREOPERATIVE DIAGNOSIS: Right  inguinal hernia.   POSTOPERATIVE DIAGNOSIS: Right inguinal hernia.   PROCEDURE: Right  inguinal hernia repair with mesh.   SURGEON: Ravinder Gomez MD   ASSISTANT: Keshav Rodriges PA-C, Physician's assistant first assistant was necessary during this procedure due to expertise in patient positioning, prepping, incisional opening, retraction, exposure, and suctioning.  ANESTHESIA: General   ESTIMATED BLOOD LOSS: 10 mL.   DRAINS: None.   COMPLICATIONS: None.   SPECIMENS: None.   OPERATIVE INDICATIONS: Mr. Chavarria has a symptomatic Right  inguinal hernia. Options were discussed and it was elected to proceed with open repair. Potential risks of bleeding, infection, neurovascular injury to the vas deferens or testicle, recurrent hernia, chronic pain were all reviewed in detail and he wished to proceed.   DESCRIPTION OF PROCEDURE: After informed consent was obtained, the patient was taken to the operating room and placed supine on the operating table. Following the induction of adequate general anesthetic, the patient's Right  groin was shaved, prepped and draped in the usual fashion. A timeout was then completed and IV antibiotics were administered. A standard groin incision was then made and dissection was carried down to the external oblique fascia. This was sharply incised and the inguinal canal was exposed. The spermatic cord and its contents were mobilized and encircled with a Penrose drain. A moderate-sized indirect hernia was present. The sac was dissected from the cord and reduced. Preperitoneal dissection was then performed and the UltraPro Hernia System was introduced, the posterior portion was deployed in the preperitoneal space. The anterior mesh was then unrolled in the inguinal canal with lateral tail being placed up and underneath the fascia of the external oblique. A slit was made in the mesh to accommodate the spermatic cord and the mesh was tacked into position using  interrupted sutures of 2-0 Vicryl. The spermatic cord was then released and the fascia of the external oblique was closed using running 0 Vicryl stitch. The operative area was infiltrated with  local anesthetic. The deep subcutaneous was closed with 3-0 Vicryl stitch. Skin incision was closed with subcuticular 4-0 Monocryl stitch. Benzoin and Steri-Strips were applied. Needle and sponge counts were correct. The patient tolerated this well. He was awakened in the operating room and taken to recovery room in stable condition.   ARTIS ZIMMERMAN MD

## 2017-06-27 NOTE — IP AVS SNAPSHOT
MRN:[id number]                      After Visit Summary   6/27/2017    Saul Chavarria III    MRN: [id number]           Thank you!     Thank you for choosing [facility] for your care. Our goal is always to provide you with excellent care. Hearing back from our patients is one way we can continue to improve our services. Please take a few minutes to complete the written survey that you may receive in the mail after you visit with us. Thank you!        Patient Information     Date Of Birth          1935        About your hospital stay     You were admitted on:  June 27, 2017 You last received care in the:  [facility] PreOP/Phase II    You were discharged on:  June 27, 2017       Who to Call     For medical emergencies, please call 911.  For non-urgent questions about your medical care, please call your primary care provider or clinic, [phone]  For questions related to your surgery, please call your surgery clinic        Attending Provider     Provider Specialty    Ravinder Gomez MD General Surgery       Primary Care Provider Office Phone # Fax #    Lina Tijerina [phone]      Further instructions from your care team                                     DISCHARGE INSTRUCTIONS  Before your discharge from the hospital, your nurse will review the following instructions for your home care.  Please read this and ask your nurse or doctor any questions before your discharge.  WOUND CARE  You may remove your dressing the morning after surgery or if drainage seeps through it. You should shower without the dressing on and replace it after bathing if the wound is still draining or if it is more comfortable to have the wound dressed.  You may use guaze and tape or bandaids depending on the size of the wound and your preference.  ACTIVITY  You may climb stairs.  You may exercise if you are comfortable.  You may drive without restrictions when you are not using  prescription pain medication and are comfortable in the car.  You may return to work / school when you are comfortable without prescription pain medication.  BATHING  You may shower.  Do not soak the wound or swim until ten days after surgery and only if the incision has been dry for 2-3 days.  You may have steri-strips (looks like tape) on your incision.  They will fall off by themselves.  DIET  Return to the diet you were on before surgery.  CALL YOUR PHYSICIAN IF YOU HAVE  Chills or fever above 101  F.  Significant or malodorous drainage from the incision(s)   Significant bleeding  Pain not relieved by your pain medication or rest.  Increasing pain after the first 48 hours  HELPFUL HINTS  Prescription pain medications make most people constipated.  It is never a bad idea to take a mild laxative (Miralax / Milk of magnesia) while you are using your prescription medication.  You may take ibuprofen instead of or in addition to your prescription.  If you are taking the maximum amount of your prescription medication, you should not take any additional Tylenol.  FOLLOW-UP  You should call to schedule a follow-up visit in about 2 weeks ([phone]).      Same Day Surgery Discharge Instructions for  Sedation and General Anesthesia       It's not unusual to feel dizzy, light-headed or faint for up to 24 hours after surgery or while taking pain medication.  If you have these symptoms: sit for a few minutes before standing and have someone assist you when you get up to walk or use the bathroom.      You should rest and relax for the next 24 hours. We recommend you make arrangements to have an adult stay with you for at least 24 hours after your discharge.  Avoid hazardous and strenuous activity.      DO NOT DRIVE any vehicle or operate mechanical equipment for 24 hours following the end of your surgery.  Even though you may feel normal, your reactions may be affected by the medication you have received.      Do not drink  "alcoholic beverages for 24 hours following surgery.       Slowly progress to your regular diet as you feel able. It's not unusual to feel nauseated and/or vomit after receiving anesthesia.  If you develop these symptoms, drink clear liquids (apple juice, ginger ale, broth, 7-up, etc. ) until you feel better.  If your nausea and vomiting persists for 24 hours, please notify your surgeon.        All narcotic pain medications, along with inactivity and anesthesia, can cause constipation. Drinking plenty of liquids and increasing fiber intake will help.      For any questions of a medical nature, call your surgeon.      Do not make important decisions for 24 hours.      If you had general anesthesia, you may have a sore throat for a couple of days related to the breathing tube used during surgery.  You may use Cepacol lozenges to help with this discomfort.  If it worsens or if you develop a fever, contact your surgeon.       If you feel your pain is not well managed with the pain medications prescribed by your surgeon, please contact your surgeon's office to let them know so they can address your concerns.           Pending Results     No orders found from 6/25/2017 to 6/28/2017.            Admission Information     Date & Time Provider Department Dept. Phone    6/27/2017 Ravinder Gomez MD [facility] PreOP/Phase [phone]      Your Vitals Were     Blood Pressure Temperature Respirations Height Weight Pulse Oximetry    112/74 97.8  F (36.6  C) (Tympanic) 20 1.778 m (5' 10\") 88.9 kg (196 lb) 91%    BMI (Body Mass Index)                   28.12 kg/m2           [employer] Information     [employer] lets you send messages to your doctor, view your test results, renew your prescriptions, schedule appointments and more. To sign up, go to [email or web address] . Click on \"Log in\" on the left side of the screen, which will take you to the Welcome page. Then click on \"Sign up Now\" on the right side of the " page.     You will be asked to enter the access code listed below, as well as some personal information. Please follow the directions to create your username and password.     Your access code is: [email or web address]  Expires: 2017  8:11 AM     Your access code will  in 90 days. If you need help or a new code, please call your [facility] clinic or [phone].        Care EveryWhere ID     This is your Care EveryWhere ID. This could be used by other organizations to access your [facility] medical records  [id number]        Equal Access to Services     [facility]: Hadii yessi Campa, walonnie pitts, qaasha kaalmamitul markham, marina gilbert . So [facility] [phone].    ATENCIÓN: Si habla español, tiene a ashley disposición servicios gratuitos de asistencia lingüística. Llame al [phone].    We comply with applicable federal civil rights laws and [place] laws. We do not discriminate on the basis of race, color, national origin, age, disability sex, sexual orientation or gender identity.               Review of your medicines      START taking        Dose / Directions    HYDROcodone-acetaminophen 5-325 MG per tablet   Commonly known as:  NORCO   Used for:  Right inguinal hernia, Acute post-operative pain        Dose:  1-2 tablet   Take 1-2 tablets by mouth every 4 hours as needed for other (Moderate to Severe Pain)   Quantity:  25 tablet   Refills:  0         CONTINUE these medicines which have NOT CHANGED        Dose / Directions    LISINOPRIL PO        Dose:  10 mg   Take 10 mg by mouth daily   Refills:  0            Where to get your medicines      Some of these will need a paper prescription and others can be bought over the counter. Ask your nurse if you have questions.     Bring a paper prescription for each of these medications     HYDROcodone-acetaminophen 5-325 MG per tablet                Protect others around you: Learn how to safely use, store and throw away your  medicines at www.disposemymeds.org.             Medication List: This is a list of all your medications and when to take them. Check marks below indicate your daily home schedule. Keep this list as a reference.      Medications           Morning Afternoon Evening Bedtime As Needed    HYDROcodone-acetaminophen 5-325 MG per tablet   Commonly known as:  NORCO   Take 1-2 tablets by mouth every 4 hours as needed for other (Moderate to Severe Pain)                                LISINOPRIL PO   Take 10 mg by mouth daily

## 2017-06-27 NOTE — IP AVS SNAPSHOT
[facility] PreOP/Phase II    [address]    [place] [address]    Phone:  [phone]                                       After Visit Summary   6/27/2017    Saul Chavarria III    MRN: [id number]           After Visit Summary Signature Page     I have received my discharge instructions, and my questions have been answered. I have discussed any challenges I see with this plan with the nurse or doctor.    ..........................................................................................................................................  Patient/Patient Representative Signature      ..........................................................................................................................................  Patient Representative Print Name and Relationship to Patient    ..................................................               ................................................  Date                                            Time    ..........................................................................................................................................  Reviewed by Signature/Title    ...................................................              ..............................................  Date                                                            Time

## 2017-06-27 NOTE — ANESTHESIA POSTPROCEDURE EVALUATION
Patient: Saul Chavarria III    Procedure(s):  OPEN RIGHT INGUINAL HERNIA REPAIR WITH MESH - Wound Class: I-Clean    Diagnosis:RIGHT INGUINAL HERNIA  Diagnosis Additional Information: No value filed.    Anesthesia Type:  General, LMA    Note:  Anesthesia Post Evaluation    Patient location during evaluation: PACU  Patient participation: Able to fully participate in evaluation  Level of consciousness: awake  Pain management: adequate  Airway patency: patent  Cardiovascular status: acceptable  Respiratory status: acceptable  Hydration status: acceptable  PONV: none     Anesthetic complications: None          Last vitals:  Vitals:    06/27/17 1215 06/27/17 1230 06/27/17 1245   BP: 126/69 117/72 112/74   Resp: 12 27 20   Temp:   36.6  C (97.8  F)   SpO2: 96% 97% 91%         Electronically Signed By: EDMUNDO FIGUEROA MD  June 27, 2017  1:12 PM

## 2017-06-27 NOTE — DISCHARGE INSTRUCTIONS
DISCHARGE INSTRUCTIONS  Before your discharge from the hospital, your nurse will review the following instructions for your home care.  Please read this and ask your nurse or doctor any questions before your discharge.  WOUND CARE  You may remove your dressing the morning after surgery or if drainage seeps through it. You should shower without the dressing on and replace it after bathing if the wound is still draining or if it is more comfortable to have the wound dressed.  You may use guaze and tape or bandaids depending on the size of the wound and your preference.  ACTIVITY  You may climb stairs.  You may exercise if you are comfortable.  You may drive without restrictions when you are not using prescription pain medication and are comfortable in the car.  You may return to work / school when you are comfortable without prescription pain medication.  BATHING  You may shower.  Do not soak the wound or swim until ten days after surgery and only if the incision has been dry for 2-3 days.  You may have steri-strips (looks like tape) on your incision.  They will fall off by themselves.  DIET  Return to the diet you were on before surgery.  CALL YOUR PHYSICIAN IF YOU HAVE  Chills or fever above 101  F.  Significant or malodorous drainage from the incision(s)   Significant bleeding  Pain not relieved by your pain medication or rest.  Increasing pain after the first 48 hours  HELPFUL HINTS  Prescription pain medications make most people constipated.  It is never a bad idea to take a mild laxative (Miralax / Milk of magnesia) while you are using your prescription medication.  You may take ibuprofen instead of or in addition to your prescription.  If you are taking the maximum amount of your prescription medication, you should not take any additional Tylenol.  FOLLOW-UP  You should call to schedule a follow-up visit in about 2 weeks ([phone]).      Same Day Surgery Discharge Instructions  for  Sedation and General Anesthesia       It's not unusual to feel dizzy, light-headed or faint for up to 24 hours after surgery or while taking pain medication.  If you have these symptoms: sit for a few minutes before standing and have someone assist you when you get up to walk or use the bathroom.      You should rest and relax for the next 24 hours. We recommend you make arrangements to have an adult stay with you for at least 24 hours after your discharge.  Avoid hazardous and strenuous activity.      DO NOT DRIVE any vehicle or operate mechanical equipment for 24 hours following the end of your surgery.  Even though you may feel normal, your reactions may be affected by the medication you have received.      Do not drink alcoholic beverages for 24 hours following surgery.       Slowly progress to your regular diet as you feel able. It's not unusual to feel nauseated and/or vomit after receiving anesthesia.  If you develop these symptoms, drink clear liquids (apple juice, ginger ale, broth, 7-up, etc. ) until you feel better.  If your nausea and vomiting persists for 24 hours, please notify your surgeon.        All narcotic pain medications, along with inactivity and anesthesia, can cause constipation. Drinking plenty of liquids and increasing fiber intake will help.      For any questions of a medical nature, call your surgeon.      Do not make important decisions for 24 hours.      If you had general anesthesia, you may have a sore throat for a couple of days related to the breathing tube used during surgery.  You may use Cepacol lozenges to help with this discomfort.  If it worsens or if you develop a fever, contact your surgeon.       If you feel your pain is not well managed with the pain medications prescribed by your surgeon, please contact your surgeon's office to let them know so they can address your concerns.

## 2017-06-27 NOTE — ANESTHESIA CARE TRANSFER NOTE
Patient: Saul Chavarria III    Procedure(s):  OPEN RIGHT INGUINAL HERNIA REPAIR WITH MESH - Wound Class: I-Clean    Diagnosis: RIGHT INGUINAL HERNIA  Diagnosis Additional Information: No value filed.    Anesthesia Type:   General, LMA     Note:  Airway :Face Mask  Patient transferred to:PACU  Comments: To hospitals PACU: Awake, good airway, 02 face mask, VSS  Report to RN      Vitals: (Last set prior to Anesthesia Care Transfer)    CRNA VITALS  6/27/2017 1108 - 6/27/2017 1144      6/27/2017             NIBP: 136/68    Pulse: 87    NIBP Mean: 88    SpO2: 100 %                Electronically Signed By: ARUN Lujan CRNA  June 27, 2017  11:44 AM

## 2017-06-27 NOTE — ANESTHESIA PREPROCEDURE EVALUATION
Anesthesia Evaluation     .             ROS/MED HX    ENT/Pulmonary:     (+)sleep apnea, tobacco use, Past use , . .    Neurologic:       Cardiovascular:     (+) hypertension----. : . . fainting (syncope). :. .       METS/Exercise Tolerance:     Hematologic:     (+) Anemia, -      Musculoskeletal:         GI/Hepatic:     (+) Other GI/Hepatic GI AVM's;     (-) GERD   Renal/Genitourinary:         Endo:         Psychiatric:         Infectious Disease:         Malignancy:   (+) Malignancy History of Prostate          Other:                     Physical Exam  Normal systems: cardiovascular, pulmonary and dental    Airway   Mallampati: II  TM distance: >3 FB  Neck ROM: full    Dental     Cardiovascular       Pulmonary                     Anesthesia Plan      History & Physical Review  History and physical reviewed and following examination; no interval change.    ASA Status:  2 .    NPO Status:  > 8 hours    Plan for General and LMA with Intravenous induction. Maintenance will be Balanced.    PONV prophylaxis:  Ondansetron (or other 5HT-3)       Postoperative Care  Postoperative pain management:  IV analgesics.      Consents  Anesthetic plan, risks, benefits and alternatives discussed with:  Patient..                          .

## 2017-06-30 ENCOUNTER — TELEPHONE (OUTPATIENT)
Dept: SURGERY | Facility: CLINIC | Age: 82
End: 2017-06-30

## 2017-06-30 NOTE — TELEPHONE ENCOUNTER
Attempted to call patient and do post op check with them , left VM with call back ph.# if they wished to call clinic back.    Stella Yo RN BSN

## 2017-07-11 ENCOUNTER — OFFICE VISIT (OUTPATIENT)
Dept: SURGERY | Facility: CLINIC | Age: 82
End: 2017-07-11
Payer: MEDICARE

## 2017-07-11 VITALS
DIASTOLIC BLOOD PRESSURE: 60 MMHG | SYSTOLIC BLOOD PRESSURE: 124 MMHG | WEIGHT: 196 LBS | BODY MASS INDEX: 28.06 KG/M2 | HEART RATE: 86 BPM | TEMPERATURE: 98.3 F | HEIGHT: 70 IN | OXYGEN SATURATION: 98 %

## 2017-07-11 DIAGNOSIS — Z09 SURGICAL FOLLOWUP VISIT: Primary | ICD-10-CM

## 2017-07-11 PROCEDURE — 99024 POSTOP FOLLOW-UP VISIT: CPT | Performed by: SURGERY

## 2017-07-11 NOTE — LETTER
2017    RE:  Saul Chavarria, III-:  35    Patient returns in follow-up after recent open right inguinal hernia repair with mesh.  He reports minimal pain location usage postoperatively.  Reports minimal residual discomfort this time.  He has resumed mostly normal activity.  Bowel function has returned to baseline.  Reports moderate swelling.     On examination: His incision is clean and intact.  There is modest postoperative swelling without evidence of recurrence     Overall progressing nicely.  We discussed advancement of activity.  May resume activities as comfortable.  At this point I believe he is doing well enough to follow up on an as-needed basis.  His questions were all answered to his satisfaction.    Ravinder Gomez MD

## 2017-07-11 NOTE — MR AVS SNAPSHOT
"              After Visit Summary   7/11/2017    Saul Chavarria III    MRN: [id number]           Patient Information     Date Of Birth          1935        Visit Information        Provider Department      7/11/2017 11:45 AM Ravinder Gomez MD [facility]      Today's Diagnoses     Surgical followup visit    -  1       Follow-ups after your visit        Your next 10 appointments already scheduled     Jul 11, 2017 11:45 AM CDT   Post Op with Ravinder Gomez MD   [facility] ([facility])    [address]  [facility] [id number] [phone]              Who to contact     If you have questions or need follow up information about today's clinic visit or your schedule please contact [facility] directly at [phone].  Normal or non-critical lab and imaging results will be communicated to you by MyChart, letter or phone within 4 business days after the clinic has received the results. If you do not hear from us within 7 days, please contact the clinic through [employer] or phone. If you have a critical or abnormal lab result, we will notify you by phone as soon as possible.  Submit refill requests through VFA or call your pharmacy and they will forward the refill request to us. Please allow 3 business days for your refill to be completed.          Additional Information About Your Visit        MyChart Information     VFA lets you send messages to your doctor, view your test results, renew your prescriptions, schedule appointments and more. To sign up, go to [email or web address] . Click on \"Log in\" on the left side of the screen, which will take you to the Welcome page. Then click on \"Sign up Now\" on the right side of the page.     You will be asked to enter the access code listed below, as well as some personal information. Please follow the directions to create " "your username and password.     Your access code is: [email or web address]  Expires: 2017  8:11 AM     Your access code will  in 90 days. If you need help or a new code, please call your [facility] or [phone].        Care EveryWhere ID     This is your Care EveryWhere ID. This could be used by other organizations to access your [place] medical records  [id number]        Your Vitals Were     Pulse Temperature Height Pulse Oximetry BMI (Body Mass Index)       86 98.3  F (36.8  C) (Oral) 5' 10\" (1.778 m) 98% 28.12 kg/m2        Blood Pressure from Last 3 Encounters:   17 124/60   17 116/60   17 (!) 169/97    Weight from Last 3 Encounters:   17 196 lb (88.9 kg)   17 196 lb (88.9 kg)   17 200 lb (90.7 kg)              Today, you had the following     No orders found for display       Primary Care Provider Office Phone # Fax #    Lina Tijerina [phone]       [facility] [address]  [facility] [address]        Equal Access to Services     MITCHELL PASCAL : Hadii yessi ku hadasho Soomaali, waaxda luqadaha, qaybta kaalmada adeegyada, waxay lenny haymonse gilbert . So [facility] [phone].    ATENCIÓN: Si habla español, tiene a ashley disposición servicios gratuitos de asistencia lingüística. bonifacio al [phone].    We comply with applicable federal civil rights laws and [place] laws. We do not discriminate on the basis of race, color, national origin, age, disability sex, sexual orientation or gender identity.            Thank you!     Thank you for choosing [facility]  for your care. Our goal is always to provide you with excellent care. Hearing back from our patients is one way we can continue to improve our services. Please take a few minutes to complete the written survey that you may receive in the mail after your visit with us. Thank you!             Your Updated Medication List - Protect others around you: Learn how to " safely use, store and throw away your medicines at www.disposemymeds.org.          This list is accurate as of: 7/11/17 11:43 AM.  Always use your most recent med list.                   Brand Name Dispense Instructions for use Diagnosis    LISINOPRIL PO      Take 10 mg by mouth daily

## 2017-07-13 NOTE — PROGRESS NOTES
Patient returns in follow-up after recent open right inguinal hernia repair with mesh.  He reports minimal pain location usage postoperatively.  Reports minimal residual discomfort this time.  He has resumed mostly normal activity.  Bowel function has returned to baseline.  Reports moderate swelling.    On examination: His incision is clean and intact.  There is modest postoperative swelling without evidence of recurrence    Overall progressing nicely.  We discussed advancement of activity.  May resume activities as comfortable.  At this point I believe he is doing well enough to follow up on an as-needed basis.  His questions were all answered to his satisfaction.    Please route or send letter to:  Primary Care Provider (PCP)

## 2018-07-14 ENCOUNTER — HOSPITAL ENCOUNTER (INPATIENT)
Facility: CLINIC | Age: 83
LOS: 3 days | Discharge: HOME | DRG: 389 | End: 2018-07-17
Attending: EMERGENCY MEDICINE | Admitting: INTERNAL MEDICINE
Payer: MEDICARE

## 2018-07-14 DIAGNOSIS — K56.609 SMALL BOWEL OBSTRUCTION (H): ICD-10-CM

## 2018-07-14 LAB
ALBUMIN SERPL-MCNC: 3.8 G/DL (ref 3.4–5)
ALP SERPL-CCNC: 55 U/L (ref 40–150)
ALT SERPL W P-5'-P-CCNC: 21 U/L (ref 0–70)
ANION GAP SERPL CALCULATED.3IONS-SCNC: 7 MMOL/L (ref 3–14)
AST SERPL W P-5'-P-CCNC: 21 U/L (ref 0–45)
BASOPHILS # BLD AUTO: 0 10E9/L (ref 0–0.2)
BASOPHILS NFR BLD AUTO: 0.1 %
BILIRUB SERPL-MCNC: 1.1 MG/DL (ref 0.2–1.3)
BUN SERPL-MCNC: 34 MG/DL (ref 7–30)
CALCIUM SERPL-MCNC: 8.6 MG/DL (ref 8.5–10.1)
CHLORIDE SERPL-SCNC: 104 MMOL/L (ref 94–109)
CO2 SERPL-SCNC: 29 MMOL/L (ref 20–32)
CREAT SERPL-MCNC: 1.34 MG/DL (ref 0.66–1.25)
DIFFERENTIAL METHOD BLD: ABNORMAL
EOSINOPHIL # BLD AUTO: 0 10E9/L (ref 0–0.7)
EOSINOPHIL NFR BLD AUTO: 0 %
ERYTHROCYTE [DISTWIDTH] IN BLOOD BY AUTOMATED COUNT: 13 % (ref 10–15)
GFR SERPL CREATININE-BSD FRML MDRD: 51 ML/MIN/1.7M2
GLUCOSE SERPL-MCNC: 111 MG/DL (ref 70–99)
HCT VFR BLD AUTO: 47.3 % (ref 40–53)
HGB BLD-MCNC: 16.5 G/DL (ref 13.3–17.7)
IMM GRANULOCYTES # BLD: 0 10E9/L (ref 0–0.4)
IMM GRANULOCYTES NFR BLD: 0.3 %
LACTATE BLD-SCNC: 0.8 MMOL/L (ref 0.4–1.9)
LACTATE BLD-SCNC: 1.4 MMOL/L (ref 0.7–2)
LIPASE SERPL-CCNC: 74 U/L (ref 73–393)
LYMPHOCYTES # BLD AUTO: 1.1 10E9/L (ref 0.8–5.3)
LYMPHOCYTES NFR BLD AUTO: 7.4 %
MCH RBC QN AUTO: 31.8 PG (ref 26.5–33)
MCHC RBC AUTO-ENTMCNC: 34.9 G/DL (ref 31.5–36.5)
MCV RBC AUTO: 91 FL (ref 78–100)
MONOCYTES # BLD AUTO: 1.3 10E9/L (ref 0–1.3)
MONOCYTES NFR BLD AUTO: 9.1 %
NEUTROPHILS # BLD AUTO: 12.2 10E9/L (ref 1.6–8.3)
NEUTROPHILS NFR BLD AUTO: 83.1 %
NRBC # BLD AUTO: 0 10*3/UL
NRBC BLD AUTO-RTO: 0 /100
PLATELET # BLD AUTO: 235 10E9/L (ref 150–450)
POTASSIUM SERPL-SCNC: 4.1 MMOL/L (ref 3.4–5.3)
PROT SERPL-MCNC: 7.3 G/DL (ref 6.8–8.8)
RBC # BLD AUTO: 5.19 10E12/L (ref 4.4–5.9)
SODIUM SERPL-SCNC: 140 MMOL/L (ref 133–144)
WBC # BLD AUTO: 14.7 10E9/L (ref 4–11)

## 2018-07-14 PROCEDURE — 83690 ASSAY OF LIPASE: CPT | Performed by: EMERGENCY MEDICINE

## 2018-07-14 PROCEDURE — 25000132 ZZH RX MED GY IP 250 OP 250 PS 637: Mod: GY | Performed by: INTERNAL MEDICINE

## 2018-07-14 PROCEDURE — 25000128 H RX IP 250 OP 636: Performed by: INTERNAL MEDICINE

## 2018-07-14 PROCEDURE — 85025 COMPLETE CBC W/AUTO DIFF WBC: CPT | Performed by: EMERGENCY MEDICINE

## 2018-07-14 PROCEDURE — 83605 ASSAY OF LACTIC ACID: CPT | Performed by: INTERNAL MEDICINE

## 2018-07-14 PROCEDURE — 12000007 ZZH R&B INTERMEDIATE

## 2018-07-14 PROCEDURE — 25000128 H RX IP 250 OP 636: Performed by: SURGERY

## 2018-07-14 PROCEDURE — 99222 1ST HOSP IP/OBS MODERATE 55: CPT | Performed by: SURGERY

## 2018-07-14 PROCEDURE — 99285 EMERGENCY DEPT VISIT HI MDM: CPT | Mod: 25

## 2018-07-14 PROCEDURE — 96374 THER/PROPH/DIAG INJ IV PUSH: CPT

## 2018-07-14 PROCEDURE — 25000128 H RX IP 250 OP 636: Performed by: EMERGENCY MEDICINE

## 2018-07-14 PROCEDURE — A9270 NON-COVERED ITEM OR SERVICE: HCPCS | Mod: GY | Performed by: INTERNAL MEDICINE

## 2018-07-14 PROCEDURE — 96375 TX/PRO/DX INJ NEW DRUG ADDON: CPT

## 2018-07-14 PROCEDURE — 99223 1ST HOSP IP/OBS HIGH 75: CPT | Mod: AI | Performed by: INTERNAL MEDICINE

## 2018-07-14 PROCEDURE — 80053 COMPREHEN METABOLIC PANEL: CPT | Performed by: EMERGENCY MEDICINE

## 2018-07-14 PROCEDURE — 36415 COLL VENOUS BLD VENIPUNCTURE: CPT | Performed by: INTERNAL MEDICINE

## 2018-07-14 PROCEDURE — 83605 ASSAY OF LACTIC ACID: CPT | Performed by: EMERGENCY MEDICINE

## 2018-07-14 RX ORDER — ONDANSETRON 2 MG/ML
4 INJECTION INTRAMUSCULAR; INTRAVENOUS EVERY 6 HOURS PRN
Status: DISCONTINUED | OUTPATIENT
Start: 2018-07-14 | End: 2018-07-17 | Stop reason: HOSPADM

## 2018-07-14 RX ORDER — HYDROMORPHONE HYDROCHLORIDE 1 MG/ML
.3-.5 INJECTION, SOLUTION INTRAMUSCULAR; INTRAVENOUS; SUBCUTANEOUS
Status: DISCONTINUED | OUTPATIENT
Start: 2018-07-14 | End: 2018-07-17 | Stop reason: HOSPADM

## 2018-07-14 RX ORDER — LISINOPRIL 10 MG/1
10 TABLET ORAL EVERY EVENING
Status: DISCONTINUED | OUTPATIENT
Start: 2018-07-14 | End: 2018-07-17 | Stop reason: HOSPADM

## 2018-07-14 RX ORDER — ONDANSETRON 2 MG/ML
4 INJECTION INTRAMUSCULAR; INTRAVENOUS ONCE
Status: COMPLETED | OUTPATIENT
Start: 2018-07-14 | End: 2018-07-14

## 2018-07-14 RX ORDER — ONDANSETRON 4 MG/1
4 TABLET, ORALLY DISINTEGRATING ORAL EVERY 6 HOURS PRN
Status: DISCONTINUED | OUTPATIENT
Start: 2018-07-14 | End: 2018-07-17 | Stop reason: HOSPADM

## 2018-07-14 RX ORDER — ACETAMINOPHEN 325 MG/1
325 TABLET ORAL EVERY 6 HOURS PRN
Status: DISCONTINUED | OUTPATIENT
Start: 2018-07-14 | End: 2018-07-17 | Stop reason: HOSPADM

## 2018-07-14 RX ORDER — OXYCODONE HYDROCHLORIDE 5 MG/1
5-10 TABLET ORAL
Status: DISCONTINUED | OUTPATIENT
Start: 2018-07-14 | End: 2018-07-17 | Stop reason: HOSPADM

## 2018-07-14 RX ORDER — SODIUM CHLORIDE 9 MG/ML
INJECTION, SOLUTION INTRAVENOUS CONTINUOUS
Status: DISCONTINUED | OUTPATIENT
Start: 2018-07-14 | End: 2018-07-17 | Stop reason: HOSPADM

## 2018-07-14 RX ORDER — NALOXONE HYDROCHLORIDE 0.4 MG/ML
.1-.4 INJECTION, SOLUTION INTRAMUSCULAR; INTRAVENOUS; SUBCUTANEOUS
Status: DISCONTINUED | OUTPATIENT
Start: 2018-07-14 | End: 2018-07-17 | Stop reason: HOSPADM

## 2018-07-14 RX ADMIN — Medication 0.5 MG: at 14:36

## 2018-07-14 RX ADMIN — Medication 0.5 MG: at 18:57

## 2018-07-14 RX ADMIN — SODIUM CHLORIDE: 9 INJECTION, SOLUTION INTRAVENOUS at 16:35

## 2018-07-14 RX ADMIN — Medication 0.5 MG: at 22:03

## 2018-07-14 RX ADMIN — ONDANSETRON 4 MG: 2 INJECTION INTRAMUSCULAR; INTRAVENOUS at 14:35

## 2018-07-14 RX ADMIN — LISINOPRIL 10 MG: 10 TABLET ORAL at 22:03

## 2018-07-14 RX ADMIN — ONDANSETRON 4 MG: 2 INJECTION INTRAMUSCULAR; INTRAVENOUS at 18:57

## 2018-07-14 RX ADMIN — SODIUM CHLORIDE 1000 ML: 9 INJECTION, SOLUTION INTRAVENOUS at 13:40

## 2018-07-14 ASSESSMENT — ENCOUNTER SYMPTOMS
NAUSEA: 1
FEVER: 0
ABDOMINAL PAIN: 1
CHILLS: 0
VOMITING: 1

## 2018-07-14 ASSESSMENT — PAIN DESCRIPTION - DESCRIPTORS
DESCRIPTORS: ACHING
DESCRIPTORS: CRAMPING

## 2018-07-14 NOTE — IP AVS SNAPSHOT
[address]    [facility] [address]    Phone:  [phone]                                       After Visit Summary   7/14/2018    Saul Chavarria III    MRN: [id number]           After Visit Summary Signature Page     I have received my discharge instructions, and my questions have been answered. I have discussed any challenges I see with this plan with the nurse or doctor.    ..........................................................................................................................................  Patient/Patient Representative Signature      ..........................................................................................................................................  Patient Representative Print Name and Relationship to Patient    ..................................................               ................................................  Date                                            Time    ..........................................................................................................................................  Reviewed by Signature/Title    ...................................................              ..............................................  Date                                                            Time

## 2018-07-14 NOTE — ED PROVIDER NOTES
History     Chief Complaint:  Abdominal Pain     HPI   Saul Chavarria III is a 82 year old male who presents for evaluation of abdominal pain. The patient was seen at [facility] in [place] yesterday and diagnosed with a partial small bowel obstruction with a transition point in the right mid abdomen. The radiologist's report in the CT recommended surgical consultation, however the patient reports that he wanted to come back to the [facility] to continue his care for the small bowel obstruction. The patient states that it began with a burning pain across the mid abdomen yesterday progressing to associated nausea and vomiting leading to his presentation at the hospital in [place]. He reports that he had severe pain through most of the night causing him to not be able to sleep and had dry heaving during the night. He states that currently his pain is somewhat improved. There has been no fever or chills and no blood in the emesis or stool.      Labs 7/13/2018:   CBC: WBC 12.6, HGB 16.3,    Chemistry: Sodium 144, Blood urea nitrogen 27, Creatinine 1.22, Glomerular filtration rate 57, Glucose 109, o/w WNL   Urinalysis: Ketone 40, o/w Negative     CT Abdomen Pelvis w Contrast 7/13/2018:  FINDINGS: Subsegmental atelectasis in the left lower lung is moderately distended. Presumed hepatic cysts. Left renal cyst. Prostatectomy. Calcified splenic granulomata. Adrenal glands, pancreas, and gallbladder are unremarkable. Multiple dilated loops of small bowel containing fluid with a transition point in the central anterior abdomen on axial image 102. Diffuse wall thickening of a loop of small bowel in the anterior right lower abdomen. Small amount of free fluid in the pelvis. No free air. Mild edema in the mesentery adjacent to the dilated small bowel. Atherosclerotic calcifications. Scattered degenerative changes. Exam otherwise unremarkable.  IMPRESSION:  1.  Partial small bowel obstruction with a  "transition point in the anterior central abdomen on axial image 102. Recommend surgical consultation.    Allergies:  NKDA      Medications:    Lisinopril     Past Medical History:    Anemia  Hypertension  History of prostate cancer  Sleep apnea     Past Surgical History:    Colonoscopy  Kidney stone extraction  Prostatectomy  Inguinal herniorrhaphy   Phacoemulsification clear cornea with standard intraocular lens implant, right   Prostatectomy perineal     Family History:    History reviewed. No pertinent family history.     Social History:  Tobacco use:    Former smoker, quit 1958  Alcohol use:    Positive  Marital status:       Accompanied to ED by:  Alone      Review of Systems   Constitutional: Negative for chills and fever.   Gastrointestinal: Positive for abdominal pain, nausea and vomiting.   All other systems reviewed and are negative.    Physical Exam   First Vitals:  BP: 121/68  Heart Rate: 90  Temp: 97.9  F (36.6  C)  Resp: 16  Height: 180.3 cm (5' 11\")  Weight: 90.7 kg (200 lb)  SpO2: 98 %      Physical Exam  Nursing note and vitals reviewed.  Constitutional:  Appears well-developed and well-nourished.   HENT:   Head:    Atraumatic.   Mouth/Throat:   Oropharynx is clear and moist. No oropharyngeal exudate.   Eyes:    Pupils are equal, round, and reactive to light.   Neck:    Normal range of motion. Neck supple.      No tracheal deviation present. No thyromegaly present.   Cardiovascular:  Normal rate, regular rhythm, no murmur   Pulmonary/Chest: Breath sounds are clear and equal without wheezes or crackles.  Abdominal:   Soft. Bowel sounds are high pitched. Exhibits no distension and      no mass. There is no tenderness.      There is no rebound and no guarding.      No palpable hernia.   Musculoskeletal:  Exhibits no edema.   Lymphadenopathy:  No cervical adenopathy.   Neurological:   Alert and oriented to person, place, and time.   Skin:    Skin is warm and dry. No rash noted. No pallor.  "     Emergency Department Course     Laboratory:  CBC: WBC 14.7 high, o/w WNL (HGB 16.5, )   CMP: Glucose 111 high, BUN 34 high, Creatinine 1.34 high, GFR Estimate 51 low, o/w WNL   Lipase: 74   Lactic acid: 1.4     Interventions:  1340 NS 1,000 mL IV     Emergency Department Course:  Nursing notes and vitals reviewed.  1320: I performed an exam of the patient as documented above.     1345: I spoke with Dr. Bledsoe of the surgery service regarding patient's presentation, findings, and plan of care.     I spoke with Dr. Okeefe of the hospitalist service regarding patient's presentation, findings, and plan of care.     Findings and plan explained to the Patient who consents to admission. Discussed the patient with Dr. Okeefe, who will admit the patient to a med bed for further monitoring, evaluation, and treatment.     Impression & Plan      Medical Decision Making:  Saul Chavarria III is a 82 year old male who I found to have a small bowel obstruction with a transition point in the mid abdomen. The patient was seen and had a CT yesterday at a hospital in [place]. I consulted general surgery and spoke with Dr. Bledsoe because of the CT findings. She evaluated the patient here in the ED to decide whether or not he will need surgical management. The patient does not have any signs of ischemic bowel considering that his pain is somewhat improved and his lactic acid is normal at 1.4. He does not have any signs of infection or sepsis. He was admitted to the care of the hospitalist, Dr. Okeefe.     Diagnosis:    ICD-10-CM   1. Small bowel obstruction K56.609       Disposition:  Admitted to Dr. Okeefe.       I, Elkin Norman, am serving as a scribe at 1:46 PM on 7/14/2018 to document services personally performed by Dr. Pollard, based on my observations and the provider's statements to me.     EMERGENCY DEPARTMENT       Verna Pollard MD  07/14/18 2057

## 2018-07-14 NOTE — ED NOTES
"[facility]  ED Nurse Handoff Report    ED Chief complaint: Abdominal Pain (Mid-abdominal pain since Friday morning, one episode of vomiting. Patient diagnosed with a small bowel obstruction yesterday at a hospital in [place].)      ED Diagnosis:   Final diagnoses:   Small bowel obstruction       Code Status: Full Code    Allergies: No Known Allergies    Activity level - Baseline/Home:  Independent    Activity Level - Current:   Independent     Needed?: No    Isolation: No  Infection: Not Applicable  Bariatric?: No    Vital Signs:   Vitals:    07/14/18 1221   BP: 121/68   Resp: 16   Temp: 97.9  F (36.6  C)   TempSrc: Oral   SpO2: 98%   Weight: 90.7 kg (200 lb)   Height: 1.803 m (5' 11\")       Cardiac Rhythm: ,        Pain level:      Is this patient confused?: No   [place] - Suicide Severity Rating Scale Completed?  yes  If yes, what color did the patient score?  White    Patient Report: Initial Complaint: abd pain  Focused Assessment:  82 year old male who presents for evaluation of abdominal pain. The patient was seen at [facility] in [place] yesterday and diagnosed with a partial small bowel obstruction with a transition point in the right mid abdomen. The radiologist's report in the CT recommended surgical consultation, however the patient reports that he wanted to come back to the [facility] to continue his care for the small bowel obstruction. The patient states that it began with a burning pain across the mid abdomen yesterday progressing to associated nausea and vomiting leading to his presentation at the hospital in [place]. He reports that he had severe pain through most of the night causing him to not be able to sleep and had dry heaving during the night. He states that currently his pain is somewhat improved. There has been no fever or chills and no blood in the emesis or stool.    Tests Performed: labs  Abnormal Results:   Results for orders placed or performed " during the hospital encounter of 07/14/18   CBC with platelets + differential   Result Value Ref Range    WBC 14.7 (H) 4.0 - 11.0 10e9/L    RBC Count 5.19 4.4 - 5.9 10e12/L    Hemoglobin 16.5 13.3 - 17.7 g/dL    Hematocrit 47.3 40.0 - 53.0 %    MCV 91 78 - 100 fl    MCH 31.8 26.5 - 33.0 pg    MCHC 34.9 31.5 - 36.5 g/dL    RDW 13.0 10.0 - 15.0 %    Platelet Count 235 150 - 450 10e9/L    Diff Method Automated Method     % Neutrophils 83.1 %    % Lymphocytes 7.4 %    % Monocytes 9.1 %    % Eosinophils 0.0 %    % Basophils 0.1 %    % Immature Granulocytes 0.3 %    Nucleated RBCs 0 0 /100    Absolute Neutrophil 12.2 (H) 1.6 - 8.3 10e9/L    Absolute Lymphocytes 1.1 0.8 - 5.3 10e9/L    Absolute Monocytes 1.3 0.0 - 1.3 10e9/L    Absolute Eosinophils 0.0 0.0 - 0.7 10e9/L    Absolute Basophils 0.0 0.0 - 0.2 10e9/L    Abs Immature Granulocytes 0.0 0 - 0.4 10e9/L    Absolute Nucleated RBC 0.0    Comprehensive metabolic panel   Result Value Ref Range    Sodium 140 133 - 144 mmol/L    Potassium 4.1 3.4 - 5.3 mmol/L    Chloride 104 94 - 109 mmol/L    Carbon Dioxide 29 20 - 32 mmol/L    Anion Gap 7 3 - 14 mmol/L    Glucose 111 (H) 70 - 99 mg/dL    Urea Nitrogen 34 (H) 7 - 30 mg/dL    Creatinine 1.34 (H) 0.66 - 1.25 mg/dL    GFR Estimate 51 (L) >60 mL/min/1.7m2    GFR Estimate If Black 62 >60 mL/min/1.7m2    Calcium 8.6 8.5 - 10.1 mg/dL    Bilirubin Total 1.1 0.2 - 1.3 mg/dL    Albumin 3.8 3.4 - 5.0 g/dL    Protein Total 7.3 6.8 - 8.8 g/dL    Alkaline Phosphatase 55 40 - 150 U/L    ALT 21 0 - 70 U/L    AST 21 0 - 45 U/L   Lipase   Result Value Ref Range    Lipase 74 73 - 393 U/L   Lactic acid   Result Value Ref Range    Lactic Acid 1.4 0.7 - 2.0 mmol/L       Treatments provided:     Family Comments: son at bedside    OBS brochure/video discussed/provided to patient: No    ED Medications:   Medications   HYDROmorphone (PF) (DILAUDID) injection 0.3-0.5 mg (0.5 mg Intravenous Given 7/14/18 7396)   0.9% sodium chloride BOLUS (1,000 mLs  Intravenous New Bag 7/14/18 1340)   ondansetron (ZOFRAN) injection 4 mg (4 mg Intravenous Given 7/14/18 1435)       Drips infusing?:  No    For the majority of the shift this patient was Green.   Interventions performed were   .    Severe Sepsis OR Septic Shock Diagnosis Present: No      ED NURSE PHONE NUMBER: [phone]

## 2018-07-14 NOTE — H&P
[facility]    History and Physical  Hospitalist       Date of Admission:  7/14/2018    Assessment & Plan   Saul Chavarria III is a 82 year old male with history of hypertension, chronic kidney disease stage II open radical prostatectomy and hernia surgery who presents with abdominal pain, nausea and vomiting for 1 day    Partial small bowel obstruction  -Appreciate surgical evaluation.  Patient currently refusing NG tube placement.  Agrees to have NG tube placed if nausea getting worse for bowel decompression  -Bowel rest with IV hydration, pain management, n.p.o. status, antiemetics    Hypertension  -PTA on lisinopril.  Resumed with holding parameters    Leukocytosis  -Likely reactive in the setting of bowel obstruction.  No history of fever.  Monitor off antibiotics    Acute kidney injury on chronic kidney disease stage 2  -Likely prerenal from multiple episodes of vomiting.  Baseline creatinine around 1.1-1.2 looking at his prior records.  Admission creatinine was 1.34  -Gentle hydration, monitor, avoid nephrotoxins    # Pain Assessment:  Current Pain Score 6/27/2017   Patient currently in pain? denies   Pain score (0-10) -   - Saul is experiencing pain due to bowel obstruction. Pain management was discussed and the plan was created in a collaborative fashion.  Saul's response to the current recommendations: engaged  - Please see the plan for pain management as documented above        DVT Prophylaxis: Pneumatic Compression Devices  Code Status: Full Code    Disposition: Expected discharge in 2-3 days once bowel obstruction results and clearance by surgery.    Richa Okeefe MD    Primary Care Physician   Lina Tijerina MD    Chief Complaint   Abdominal pain nausea vomiting    History is obtained from the patient    History of Present Illness   Saul Chavarria III is a 82 year old male with history of hypertension, chronic kidney disease stage II open radical prostatectomy and hernia  surgery who presents with abdominal pain, nausea and vomiting for 1 day.  Patient reports he was in his usual state of health until yesterday morning when he started noticing generalized abdominal pain.  It gradually got worse and he started noticing nausea and vomiting.  He went to emergency department at [place] yesterday where he had a CT abdomen and pelvis which showed evidence of partial bowel obstruction.  Patient has not had any bowel movement for the last 2 days he was recommended to have surgical evaluation however he refused to be admitted out of town so he was discharged.  He continued to feel worse at night with nausea vomiting and dry heaves and abdominal pain which was intermittent in nature.  He did mention that Tylenol would improve his pain a little bit.  When he did not get better he presented to the ED.  Patient reports he is currently feeling better since he got some pain medication.  He also denies any nausea or vomiting currently.  He was offered NG tube which he refused at this point however did explain to him that if he develops worsening of his symptoms he might need NG tube decompression and he agreed.  In the ED he had a CMP which showed creatinine of 1.34 which is slightly increased as compared to his baseline of 1.1-1.2, mild leukocytosis, normal lactate.  He was evaluated by general surgery in the ED who recommended conservative management.  If he starts getting worse than he might need exploratory laparotomy/laparoscopy.    Past Medical History    I have reviewed this patient's medical history and updated it with pertinent information if needed.   Past Medical History:   Diagnosis Date     Anemia     6/'17 hgb = 14.5     Hypertension      Prostate cancer (H)     prostate     Sleep apnea     uses CPAP       Past Surgical History   I have reviewed this patient's surgical history and updated it with pertinent information if needed.  Past Surgical History:   Procedure Laterality Date      COLONOSCOPY       GENITOURINARY SURGERY      kidney stone extraction     GENITOURINARY SURGERY      prostatectomy     HERNIORRHAPHY INGUINAL Right 6/27/2017    Procedure: HERNIORRHAPHY INGUINAL;  OPEN RIGHT INGUINAL HERNIA REPAIR WITH MESH;  Surgeon: Ravinder Gomez MD;  Location:  OR     PHACOEMULSIFICATION CLEAR CORNEA WITH STANDARD INTRAOCULAR LENS IMPLANT Right 4/25/2017    Procedure: PHACOEMULSIFICATION CLEAR CORNEA WITH STANDARD INTRAOCULAR LENS IMPLANT;  RIGHT EYE PHACOEMULSIFICATION CLEAR CORNEA WITH STANDARD INTRAOCULAR LENS IMPLANT;  Surgeon: Wanda Jerez MD;  Location:  EC     PROSTATECTOMY PERINEAL         Prior to Admission Medications   Prior to Admission Medications   Prescriptions Last Dose Informant Patient Reported? Taking?   ACETAMINOPHEN PO 7/13/2018 Self Yes Yes   Sig: Take 325 mg by mouth every 6 hours as needed for pain   LISINOPRIL PO 7/13/2018 at PM Self Yes Yes   Sig: Take 10 mg by mouth daily      Facility-Administered Medications: None     Allergies   No Known Allergies    Social History   I have reviewed this patient's social history and updated it with pertinent information if needed. Saul Chavarria III  reports that he quit smoking about 60 years ago. His smoking use included Cigarettes. He has quit using smokeless tobacco. He reports that he drinks alcohol. He reports that he does not use illicit drugs.    Family History   Family history reviewed with patient and is noncontributory.    Review of Systems   The 10 point Review of Systems is negative other than noted in the HPI.    Physical Exam   Temp: 97.9  F (36.6  C) Temp src: Oral BP: 123/71   Heart Rate: 90 Resp: 16 SpO2: 91 % O2 Device: None (Room air)    Vital Signs with Ranges  Temp:  [97.9  F (36.6  C)] 97.9  F (36.6  C)  Heart Rate:  [90] 90  Resp:  [16] 16  BP: (121-131)/(68-94) 123/71  SpO2:  [91 %-98 %] 91 %  200 lbs 0 oz    Exam:  Constitutional: Awake, alert and no distress. Appears comfortable  Head:  Normocephalic. No masses, lesions, tenderness or abnormalities  ENT: ENT exam normal, no neck nodes or sinus tenderness  Cardiovascular: RRR.  No murmurs, no rubs or JVD  Respiratory: Normal WOB,b/l equal air entry, no wheezes or crackles   Gastrointestinal: Abdomen soft, non-tender. BS normal. No masses, organomegaly  : Deferred   extremities : No edema , no clubbing or cyanosis    Musculoakeletal : No joint swelling , erythema . No obvious deformity   Skin: Warm and dry, no rash  Neurologic: Cranial nerves II-XII grossly intact , power symmetrical bilaterally, Reflexes normal and symmetric. Sensation grossly WNL.    Data   Data reviewed today:  I personally reviewed no images or EKG's today.    Recent Labs  Lab 07/14/18  1335   WBC 14.7*   HGB 16.5   MCV 91         POTASSIUM 4.1   CHLORIDE 104   CO2 29   BUN 34*   CR 1.34*   ANIONGAP 7   KATY 8.6   *   ALBUMIN 3.8   PROTTOTAL 7.3   BILITOTAL 1.1   ALKPHOS 55   ALT 21   AST 21   LIPASE 74       No results found for this or any previous visit (from the past 24 hour(s)).

## 2018-07-14 NOTE — PROGRESS NOTES
RECEIVING UNIT ED HANDOFF REVIEW    ED Nurse Handoff Report was reviewed by: Juan Humphrey on July 14, 2018 at 3:34 PM

## 2018-07-14 NOTE — IP AVS SNAPSHOT
MRN:[id number]                      After Visit Summary   7/14/2018    Saul Chavarria III    MRN: [id number]           Thank you!     Thank you for choosing [facility] for your care. Our goal is always to provide you with excellent care. Hearing back from our patients is one way we can continue to improve our services. Please take a few minutes to complete the written survey that you may receive in the mail after you visit with us. Thank you!        Patient Information     Date Of Birth          1935        Designated Caregiver       Most Recent Value    Caregiver    Will someone help with your care after discharge? no      About your hospital stay     You were admitted on:  July 14, 2018 You last received care in the:  [facility] [address] [facility]    You were discharged on:  July 17, 2018        Reason for your hospital stay       Small bowel obstruction                  Who to Call     For medical emergencies, please call 911.  For non-urgent questions about your medical care, please call your primary care provider or clinic, [phone]          Attending Provider     Provider Specialty    Verna Pollard MD Emergency Medicine    Richa Okeefe MD Internal Medicine       Primary Care Provider Office Phone # Fax #    Lina Tijerina [phone]      After Care Instructions     Activity       Your activity upon discharge: activity as tolerated            Diet       Follow this diet upon discharge: Orders Placed This Encounter      Low Fiber Diet -- for 1 week (avoid raw fruits and vegetables), then resume regular diet after that.            Discharge Instructions       Call Dr. Whittaker at Pager [phone] if questions, or Cell Phone [phone].                  Follow-up Appointments     Follow-up and recommended labs and tests        Follow up with primary care provider, Lina Tijerina MD, in 5 to 7 days for hospital follow- up.  No follow up labs or test  "are needed.                  Pending Results     No orders found from 2018 to 7/15/2018.            Statement of Approval     Ordered          [id number]  I have reviewed and agree with all the recommendations and orders detailed in this document.  EFFECTIVE NOW     Comments:  Discharge at 6:30 PO if doing well.   Approved and electronically signed by:  Santana Whittaker MD             Admission Information     Date & Time Provider Department Dept. Phone    2018 Richa Okeefe MD [facility] [address] [facility] [phone]      Your Vitals Were     Blood Pressure Temperature Respirations Height Weight Pulse Oximetry    146/80 (BP Location: Right arm) 97.7  F (36.5  C) (Oral) 16 1.803 m (5' 11\") 90.7 kg (200 lb) 96%    BMI (Body Mass Index)                   27.89 kg/m2           [employer] Information     [employer] lets you send messages to your doctor, view your test results, renew your prescriptions, schedule appointments and more. To sign up, go to [email or web address] . Click on \"Log in\" on the left side of the screen, which will take you to the Welcome page. Then click on \"Sign up Now\" on the right side of the page.     You will be asked to enter the access code listed below, as well as some personal information. Please follow the directions to create your username and password.     Your access code is: [email or web address]  Expires: 10/14/2018 12:29 PM     Your access code will  in 90 days. If you need help or a new code, please call your [facility] clinic or [phone].        Care EveryWhere ID     This is your Care EveryWhere ID. This could be used by other organizations to access your [facility] medical records  [id number]        Equal Access to Services     MITCHELL PASCAL : Aleksandra Campa, walonnie pitts, qaasha kaalmada shahrzad, marina dawkins. So [facility] [phone].    ATENCIÓN: Si habla español, tiene a ashley disposición servicios gratuitos de " simasuzan Balderas al [phone].    We comply with applicable federal civil rights laws and [place] laws. We do not discriminate on the basis of race, color, national origin, age, disability, sex, sexual orientation, or gender identity.               Review of your medicines      CONTINUE these medicines which have NOT CHANGED        Dose / Directions    ACETAMINOPHEN PO        Dose:  325 mg   Take 325 mg by mouth every 6 hours as needed for pain   Refills:  0       LISINOPRIL PO        Dose:  10 mg   Take 10 mg by mouth daily   Refills:  0                Protect others around you: Learn how to safely use, store and throw away your medicines at www.disposemymeds.org.             Medication List: This is a list of all your medications and when to take them. Check marks below indicate your daily home schedule. Keep this list as a reference.      Medications           Morning Afternoon Evening Bedtime As Needed    ACETAMINOPHEN PO   Take 325 mg by mouth every 6 hours as needed for pain   Last time this was given:  Not given in hospital                            Available once every 6 hours       LISINOPRIL PO   Take 10 mg by mouth daily   Last time this was given:  10 mg on 7/16/2018  7:35 PM   Last time this was given:  7/17/18 8:00PM   Next Dose Due:  7/18/18 8:00PM                    7/18/18 8:00PM                         More Information        Small Bowel Obstruction     Small bowel obstruction can lead to tissue damage and even tissue death.   A small bowel obstruction occurs when part or all of the small intestine (bowel) is blocked. As a result, digestive contents can t move through the bowel properly and out of the body. Treatment is needed right away to remove the blockage. This can ease painful symptoms. It can also prevent serious problems, such as tissue death or bursting (rupture) of the small bowel. Without treatment, a small bowel obstruction can be fatal.  Causes of small bowel  obstruction  A small bowel obstruction can be caused by:    Scar tissue (adhesions). These may form after belly (abdominal) surgery or an infection.    Hernia. A hernia is when an organ pushes through a weak spot or tear in the abdomen wall. Part of the small bowel can push out and be seen as a bulge under the belly. Hernias can also occur internally.    Certain health problems. These include when part of the bowel slides inside another part (intussusception). Other causes include irritable bowel disease such as Crohn s disease, and inflammation and sores in the intestine (ulcerative colitis).    Abnormal tissue growths (tumors). These can form on the inside or outside of the small bowel. They are usually due to cancer.  Symptoms of small bowel obstruction  Common symptoms include:    Belly cramping and pain    Belly swelling and bloating    Upset stomach (nausea) and vomiting    Can't  pass gas    Can't pass stool (constipation)    Diarrhea  Diagnosing small bowel obstruction  Your provider will ask about your symptoms and health history. You ll also have a physical exam. Tests may also be done to confirm the problem. These can include:    Imaging tests. These provide pictures of the small bowel. Common tests include X-rays and a CT scan.    Blood tests. These check for infection and other problems, such as excess fluid loss (dehydration).    Upper GI (gastrointestinal) series with a small bowel follow-through. This test takes X-rays of the upper digestive tract from the mouth through the small bowel. An X-ray dye (contrast fluid) is used. The dye coats the inside of your upper digestive tract so it will show up clearly on X-rays.  Treating small bowel obstruction  Treatment takes place in a hospital. As part of your care, the following may be done:    No food or drink is given by mouth. This allows your bowels to rest.    An IV (intravenous) line is placed in a vein in your arm or hand. The IV line is used to give  fluids. It may also be used to give medicines. These may be needed to ease pain, nausea, and other symptoms. They may also be needed to treat or prevent infections.    A soft, thin, flexible tube (nasogastric tube) is inserted through your nose and into your stomach. The tube is used to remove extra gas and fluid in your stomach and bowels. This helps to ease symptoms such as pain and swelling.    In severe cases, surgery is done. This may be needed if the small bowel is almost or totally blocked, or there is a hole in the bowel (bowel perforation). During surgery, the blockage is removed. Parts of the bowel may also be removed if there is tissue death. Other repair may be done as well, depending on what caused the blockage. Your healthcare provider will give you more information about surgery, if needed.    You ll be watched closely in the hospital until your symptoms improve. Your provider will tell you when you can go home.  Long-term concerns   After treatment, most people recover with no lasting effects. If a long part of the bowel is removed, there is a greater chance for lifelong digestive problems. Bowel movements may become irregular. Work with your provider to learn the best ways to manage any symptoms you may have, and to protect your health.  When to call your healthcare provider  Call your provider right away if you have any of the following:    Severe pain (call 911)    Belly swelling or cramping that won t go away    Can t pass stool or gas    Nausea or vomiting (especially if the vomit looks or smells like stool)   Date Last Reviewed: 7/1/2016 2000-2017 [employer]. [address] [place] [address], [place] [address]. All rights reserved. This information is not intended as a substitute for professional medical care. Always follow your healthcare professional's instructions.                How the Colon Works  The colon (large intestine) is a muscular tube that forms the last part of the  digestive tract. It absorbs water and stores food waste. The colon is about 4 to 6 feet long. The rectum is the last 6 inches of the colon.    How food moves through the colon  Semiliquid food waste from the small intestine enters the colon at the beginning of the colon (cecum). As this waste, called stool, travels through the colon, it loses water and solidifies. Strong muscles keep the stool moving through the colon. The stool is moved toward the last section of the colon (sigmoid colon). From there, it passes into the rectum, where it is stored until it leaves the body through the anus during a bowel movement.    Date Last Reviewed: 8/1/2016 2000-2017 [employer]. [address] [place] [address], [place] [address]. All rights reserved. This information is not intended as a substitute for professional medical care. Always follow your healthcare professional's instructions.                Low-Fiber Diet     Eggs are high in protein and easy to digest.   Eating a low-fiber diet means eating foods that don t have much fiber. These foods are easy to digest.  Most of the fiber that you eat passes undigested through your bowel. This is what forms stool. Low-fiber foods can help to slow down your bowel movements. When you eat a low-fiber diet, you have fewer stools. This lets your intestine rest.  Your healthcare provider will tell you how long you need to be on this diet. It may only be for a short time. Low-fiber foods often don t give you all the nutrients you need to stay healthy. Your healthcare provider may have you take certain vitamins while you are on this diet.  Reasons to eat a low-fiber diet  The goal of a low-fiber diet is to limit the size and number of your stools. It may be prescribed if you:    Are going through chemotherapy or radiation treatments    Have had intestinal surgery    Have a condition that affects your intestine, such as irritable bowel syndrome, Crohn s disease, ulcerative colitis,  or diverticulitis  General guidelines for a low-fiber diet  In general, a low-fiber diet means having fewer than 13 grams of fiber a day. Your healthcare provider may give you a list of things you can and can t eat or drink. Read food labels. Choose foods and drinks that have as close to zero grams of fiber as possible. Here are general guidelines to follow:  Breads, pasta, cereal, rice, and other starches (6 to 11 servings daily)    What to choose: white bread, biscuits, muffins, and white rolls; plain crackers; waffles; white pasta; white rice; cream of wheat; grits; white pancakes; corn flakes; cooked potatoes without skin. Fiber content of these foods should be less than 0.5 (1/2) gram per serving.    What to avoid: whole-wheat or whole-grain breads, crackers, and pasta; breads with seeds or nuts; wheat germ; amaris crackers; cornbread; wild or brown rice; cereals with whole-grain, bran, and granola; cereals with seeds, nuts, coconut, or dried fruit; potatoes with skin  Milk and dairy (2 servings daily)    What to choose: milk, buttermilk; yogurt or ice cream without seeds or nuts; custard or pudding; sour cream; cheese and cottage cheese    What to avoid: ice cream and yogurt with seeds, nuts, or fruit chunks  Fruit (2 to 4 servings daily)    What to choose: ripe banana; ripe nectarine, peach, apricot, papaya, and plum; soft honeydew melon and cantaloupe; cooked or canned fruit without skin or seeds (not sweetened with sorbitol); applesauce; strained fruit juice (without pulp)    What to avoid: raw or dried fruit; all berries; raisins; canned and raw pineapple; prunes and prune juice; fruit juice with pulp  Vegetables (3 to 5 servings daily)    What to choose: well-cooked or canned vegetables without seeds, such as spinach, eggplant, green and wax beans, carrots, yellow squash, pumpkin; lettuce on a sandwich    What to avoid: all raw or steamed vegetables; vegetables with seeds, such as unstrained tomato sauce;  green peas; lima beans; broccoli; corn; parsnips  Meats and protein (4 to 6 ounces daily)    What to choose: tender, well-cooked meat, including ground meat, poultry, and fish; eggs; tofu; creamy peanut butter    What to avoid: tough, chewy meat with gristle; peas, including split, yellow, and black-eyed; beans, including navy, lima, black, garbanzo, soy, nelson, and lentil; peanuts and crunchy peanut butter   Fats, oils, sauces, condiments (fewer than 8 teaspoons daily)    What to choose: butter, margarine, oils, whipped cream, sour cream, mayonnaise, smooth dressings and sauces; plain gravy; smooth condiments    What to avoid: dressing with seeds or fruit chunks; pickles and relishes  Other foods and drinks    What to choose: water; plain gelatin; plain puddings; pretzels; plain cookies and cakes; honey, syrup; decaffeinated drinks, including tea and coffee      What to avoid: popcorn; potato chips; spicy foods; fried, greasy foods; alcohol (ask your healthcare provider); marmalade, jam, and preserves; desserts that have seeds, nuts, coconut, dried fruit, whole grains, or bran; candy that has seeds or nuts; drinks sweetened with sorbitol or other sugar substitutes; caffeinated drinks, including tea, coffee, soda, and energy drinks  Date Last Reviewed: 6/1/2017 2000-2017 [employer]. [address] [place] [address]. All rights reserved. This information is not intended as a substitute for professional medical care. Always follow your healthcare professional's instructions.

## 2018-07-14 NOTE — CONSULTS
[facility] General Surgery Consultation    Saul Chavarria III MRN# [id number]   YOB: 1935 Age: 82 year old      Date of Admission:  7/14/2018  Date of Consult: 7/14/2018         Assessment and Plan:   Patient is a 82 year old male with PSH s/f open radical prostatectomy who has a partial small bowel obstruction. He has minimal pain on abdominal exam and his abdomen is soft. His lactate is normal, WBC mildly elevated at 14.5. He has some nausea. No emesis today. A CT was completed in [place] yesterday and I have reviewed the report and imaging. There is a loop of small bowel in the anterior abdomen with thickening of the bowel wall and a small amount of mesenteric edema and a partial obstruction. I explained to him with his CT findings I would recommend surgical exploration; however, he has a very benign abdominal exam with no pain to palpation. At this time, I recommend conservative management - if he develops worsening nausea or emesis - place NGT for decompression. If abdominal pain worsening would then plan for laparoscopic exploration vs exploratory laparotomy.     PLAN:  Strict NPO  Place NGT if has persistent nausea or emesis  Monitor abdominal exam  Possible contrast challenge tomorrow          Requesting Physician:      Dr. Pollard        Chief Complaint:     Chief Complaint   Patient presents with     Abdominal Pain     Mid-abdominal pain since Friday morning, one episode of vomiting. Patient diagnosed with a small bowel obstruction yesterday at a hospital in [place].          History of Present Illness:   Saul Chavarria III is a 82 year old male w PMH s/f htn and prostate cancer who was seen in consultation at the request of Dr. Pollard who presented with abdominal pain. He reports that on Friday morning he began having upper abdominal pain. This worsened throughout the day and he began having nausea and vomiting and he presented to an ED in [place], [place] (he was  "vacationing with family at a cabin there). He had a CT which revealed findings concerning for a partial SBO. A surgeon was consulted and conservative management recommended. He elected to drive back to [place] for ongoing cares.             Physical Exam:   Blood pressure 121/68, temperature 97.9  F (36.6  C), temperature source Oral, resp. rate 16, height 5' 11\" (1.803 m), weight 200 lb (90.7 kg), SpO2 98 %.  200 lbs 0 oz  General: sitting comfortably in bed, no distress  Psych: Alert and Oriented.  Normal affect  Neurological: grossly intact  Eyes: Sclera clear  Respiratory:  nonlabored breathing  Cardiovascular:  Regular Rate and Rhythm   GI: abdomen mildly distended, not tender to light or deep palpation throughout, no rebound or guarding.  No hernias palpable. Well healed right inguinal and vertical midline lower abdominal scars.   Lymphatic/Hematologic/Immune:  No femoral or cervical lymphadenopathy.  Integumentary:  No rashes         Past Medical History:     Past Medical History:   Diagnosis Date     Anemia     6/'17 hgb = 14.5     Hypertension      Prostate cancer (H)     prostate     Sleep apnea     uses CPAP            Past Surgical History:     Past Surgical History:   Procedure Laterality Date     COLONOSCOPY       GENITOURINARY SURGERY      kidney stone extraction     GENITOURINARY SURGERY      prostatectomy     HERNIORRHAPHY INGUINAL Right 6/27/2017    Procedure: HERNIORRHAPHY INGUINAL;  OPEN RIGHT INGUINAL HERNIA REPAIR WITH MESH;  Surgeon: Ravinder Gomez MD;  Location:  OR     PHACOEMULSIFICATION CLEAR CORNEA WITH STANDARD INTRAOCULAR LENS IMPLANT Right 4/25/2017    Procedure: PHACOEMULSIFICATION CLEAR CORNEA WITH STANDARD INTRAOCULAR LENS IMPLANT;  RIGHT EYE PHACOEMULSIFICATION CLEAR CORNEA WITH STANDARD INTRAOCULAR LENS IMPLANT;  Surgeon: Wanda Jerez MD;  Location:  EC     PROSTATECTOMY PERINEAL              Current Medications:           sodium chloride 0.9%  1,000 mL " Intravenous Once     ondansetron  4 mg Intravenous Once       HYDROmorphone         Home Medications:     Prior to Admission medications    Medication Sig Last Dose Taking? Auth Provider   LISINOPRIL PO Take 10 mg by mouth daily   Reported, Patient            Allergies:   No Known Allergies         Family History:     Family History   Problem Relation Age of Onset     Unknown/Adopted Mother      Unknown/Adopted Father            Social History:   Saul Chavarria III  reports that he quit smoking about 60 years ago. His smoking use included Cigarettes. He has quit using smokeless tobacco. He reports that he drinks alcohol. He reports that he does not use illicit drugs.          Review of Systems:   The 10 point Review of Systems is negative other than noted in the HPI.         Labs/Imaging   All new lab and imaging data was reviewed.   I have personally reviewed the imaging studies including his CT completed at [facility] in [place]. As above it shows a loop of small bowel with wall thickening and mild mesenteric edema with a likely partial SBO.         Shirley Bledsoe MD

## 2018-07-14 NOTE — PHARMACY-ADMISSION MEDICATION HISTORY
Admission medication history interview status for the 7/14/2018  admission is complete. See EPIC admission navigator for prior to admission medications     Medication history source reliability:Good    Actions taken by pharmacist (provider contacted, etc): Spoke with patient and reviewed surescripts. Pt denies taking any other prescription/OTC medications.     Additional medication history information not noted on PTA med list :None    Medication reconciliation/reorder completed by provider prior to medication history? No    Time spent in this activity: 10 mins    Prior to Admission medications    Medication Sig Last Dose Taking? Auth Provider   ACETAMINOPHEN PO Take 325 mg by mouth every 6 hours as needed for pain 7/13/2018 Yes Unknown, Entered By History   LISINOPRIL PO Take 10 mg by mouth daily 7/13/2018 at PM Yes Reported, Patient     Glenny Sauceda, PharmD IV Student

## 2018-07-15 ENCOUNTER — APPOINTMENT (OUTPATIENT)
Dept: GENERAL RADIOLOGY | Facility: CLINIC | Age: 83
DRG: 389 | End: 2018-07-15
Attending: SURGERY
Payer: MEDICARE

## 2018-07-15 LAB
ANION GAP SERPL CALCULATED.3IONS-SCNC: 9 MMOL/L (ref 3–14)
BUN SERPL-MCNC: 33 MG/DL (ref 7–30)
CALCIUM SERPL-MCNC: 7.8 MG/DL (ref 8.5–10.1)
CHLORIDE SERPL-SCNC: 110 MMOL/L (ref 94–109)
CO2 SERPL-SCNC: 24 MMOL/L (ref 20–32)
CREAT SERPL-MCNC: 1.05 MG/DL (ref 0.66–1.25)
ERYTHROCYTE [DISTWIDTH] IN BLOOD BY AUTOMATED COUNT: 13 % (ref 10–15)
GFR SERPL CREATININE-BSD FRML MDRD: 67 ML/MIN/1.7M2
GLUCOSE SERPL-MCNC: 97 MG/DL (ref 70–99)
HCT VFR BLD AUTO: 43.4 % (ref 40–53)
HGB BLD-MCNC: 14.5 G/DL (ref 13.3–17.7)
MCH RBC QN AUTO: 30.6 PG (ref 26.5–33)
MCHC RBC AUTO-ENTMCNC: 33.4 G/DL (ref 31.5–36.5)
MCV RBC AUTO: 92 FL (ref 78–100)
PLATELET # BLD AUTO: 202 10E9/L (ref 150–450)
POTASSIUM SERPL-SCNC: 3.9 MMOL/L (ref 3.4–5.3)
RBC # BLD AUTO: 4.74 10E12/L (ref 4.4–5.9)
SODIUM SERPL-SCNC: 143 MMOL/L (ref 133–144)
WBC # BLD AUTO: 12.1 10E9/L (ref 4–11)

## 2018-07-15 PROCEDURE — 99232 SBSQ HOSP IP/OBS MODERATE 35: CPT | Performed by: INTERNAL MEDICINE

## 2018-07-15 PROCEDURE — 80048 BASIC METABOLIC PNL TOTAL CA: CPT | Performed by: INTERNAL MEDICINE

## 2018-07-15 PROCEDURE — 25000128 H RX IP 250 OP 636: Performed by: INTERNAL MEDICINE

## 2018-07-15 PROCEDURE — 36415 COLL VENOUS BLD VENIPUNCTURE: CPT | Performed by: INTERNAL MEDICINE

## 2018-07-15 PROCEDURE — 74018 RADEX ABDOMEN 1 VIEW: CPT

## 2018-07-15 PROCEDURE — A9270 NON-COVERED ITEM OR SERVICE: HCPCS | Mod: GY | Performed by: INTERNAL MEDICINE

## 2018-07-15 PROCEDURE — 25000132 ZZH RX MED GY IP 250 OP 250 PS 637: Mod: GY | Performed by: INTERNAL MEDICINE

## 2018-07-15 PROCEDURE — 12000007 ZZH R&B INTERMEDIATE

## 2018-07-15 PROCEDURE — 99231 SBSQ HOSP IP/OBS SF/LOW 25: CPT | Performed by: SURGERY

## 2018-07-15 PROCEDURE — 85027 COMPLETE CBC AUTOMATED: CPT | Performed by: INTERNAL MEDICINE

## 2018-07-15 RX ADMIN — SODIUM CHLORIDE: 9 INJECTION, SOLUTION INTRAVENOUS at 22:03

## 2018-07-15 RX ADMIN — SODIUM CHLORIDE: 9 INJECTION, SOLUTION INTRAVENOUS at 12:32

## 2018-07-15 RX ADMIN — SODIUM CHLORIDE: 9 INJECTION, SOLUTION INTRAVENOUS at 01:44

## 2018-07-15 RX ADMIN — LISINOPRIL 10 MG: 10 TABLET ORAL at 20:16

## 2018-07-15 RX ADMIN — DIATRIZOATE MEGLUMINE AND DIATRIZOATE SODIUM 90 ML: 660; 100 SOLUTION ORAL; RECTAL at 10:03

## 2018-07-15 ASSESSMENT — PAIN DESCRIPTION - DESCRIPTORS: DESCRIPTORS: BURNING

## 2018-07-15 NOTE — PLAN OF CARE
Problem: Patient Care Overview  Goal: Plan of Care/Patient Progress Review  Outcome: No Change  VSS except low O2 saturation consistent 90-95; atelectasis on CT scan from previous hospital yesterday. HTN all evening. A + O x 4. Some N +V. Independent in room. Pain Dilaudid x2, lower abdomen. NPO except ice chips. Neuros intact. Ambulating and voiding well. No BM or gas.

## 2018-07-15 NOTE — PLAN OF CARE
Problem: Patient Care Overview  Goal: Plan of Care/Patient Progress Review  Outcome: No Change  VSS.  Pt denies pain.  Bowel sounds are active and audible, pt states he is passing flatus and had BM today. NPO+ice chips. Pt completed Gastrografin prep.  R PIV infusing, site is [facility].  Pt up ad alvaro.  Gastrografin challenge today at 1800.  Continue to monitor.

## 2018-07-15 NOTE — PROGRESS NOTES
"General Surgery Progress Note    Subjective: pt reports he is feeling better this morning. No pain or nausea. No meds recently. Does endorse episodes last night of increased pain and some mild nausea. Is passing gas this am. No bm. Afeb, vss.     Objective: /61 (BP Location: Left arm)  Temp 97.6  F (36.4  C) (Oral)  Resp 17  Ht 5' 11\" (1.803 m)  Wt 200 lb (90.7 kg)  SpO2 95%  BMI 27.89 kg/m2  Gen: alert, lying in bed, no distress  CV: RRR  Pulm: nonlabored breathing  Abd: soft, nontender throughout  Ext: WWP    Assessment/Plan:   Saul Chavarria III  is a 82 year old male with concern for pSBO on CT - some flatus this am. Try gastrograffin challenge. Will have him take contrast slowly PO. F/u XR 8 hrs after finishes contrast.   - gastrograffin challenge  - f/u yg Bledsoe MD  [facility]  [phone]              "

## 2018-07-15 NOTE — PLAN OF CARE
"Problem: Bowel Obstruction (Adult)  Goal: Signs and Symptoms of Listed Potential Problems Will be Absent, Minimized or Managed (Bowel Obstruction)  Signs and symptoms of listed potential problems will be absent, minimized or managed by discharge/transition of care (reference Bowel Obstruction (Adult) CPG).   Outcome: Improving  A&Ox4, pleasant. C/o ABD pain rated at a \"5\", refusing pain medication t/o night. VSS. BS hypoactive, did pass some flatus towards end of shift. NPO+ice chips. C/o waves of nausea, but refused nausea meds. No dry heaving or emesis on shift. Urine wally colored. PIV infusing NS @ 100 mL/hr. Independent in room. Discharge pending. Continue to monitor.       "

## 2018-07-16 PROCEDURE — 99232 SBSQ HOSP IP/OBS MODERATE 35: CPT | Performed by: INTERNAL MEDICINE

## 2018-07-16 PROCEDURE — 12000007 ZZH R&B INTERMEDIATE

## 2018-07-16 PROCEDURE — 25000132 ZZH RX MED GY IP 250 OP 250 PS 637: Mod: GY | Performed by: INTERNAL MEDICINE

## 2018-07-16 PROCEDURE — 25000128 H RX IP 250 OP 636: Performed by: INTERNAL MEDICINE

## 2018-07-16 PROCEDURE — A9270 NON-COVERED ITEM OR SERVICE: HCPCS | Mod: GY | Performed by: INTERNAL MEDICINE

## 2018-07-16 PROCEDURE — 99231 SBSQ HOSP IP/OBS SF/LOW 25: CPT | Performed by: SURGERY

## 2018-07-16 RX ADMIN — LISINOPRIL 10 MG: 10 TABLET ORAL at 19:35

## 2018-07-16 RX ADMIN — SODIUM CHLORIDE: 9 INJECTION, SOLUTION INTRAVENOUS at 17:34

## 2018-07-16 RX ADMIN — SODIUM CHLORIDE: 9 INJECTION, SOLUTION INTRAVENOUS at 07:44

## 2018-07-16 NOTE — PROGRESS NOTES
Afebrile, pulse OK. No abdominal pain. Minimal bloating. Several loose BM last night. No vomiting or nausea.  Exam: abdomen soft, not tender  Labs: OK, lytes fine, CBC heading towards normal  Film: contrast went into colon readily.   Imp: no evidence of bowel obstruction. Suggest feeding, home soon.

## 2018-07-16 NOTE — PROGRESS NOTES
Met with the patient in his room regarding discharge planning.  Patient is A+Ox4 independent in the room.  He is declining the need for any DME or follow up at discharge.  Patient has a preferred rx of [facility] and he will pick any new rx up there for discharge.  Patient's son has his car and will pick him up at discharge.

## 2018-07-16 NOTE — PLAN OF CARE
"Problem: Bowel Obstruction (Adult)  Goal: Signs and Symptoms of Listed Potential Problems Will be Absent, Minimized or Managed (Bowel Obstruction)  Signs and symptoms of listed potential problems will be absent, minimized or managed by discharge/transition of care (reference Bowel Obstruction (Adult) CPG).   Outcome: Improving  Patient A&Ox4, pleasant. VSS. No c/o pain on shift,  just states stomach feels bloated and feels stomach is \"growling\" a lot. BS active. Patient passing flatus. 2 BM's 7/15, one formed and loose. Had one loose BM on shift, 200 mL, brown. Continues to be NPO+ice chips. Voiding via urinal, urine wally colored. Right PIV infusing NS @ 100 mL/hr. Independent in room. ABD x-ray done on 7/15. Discharge pending. Continue to monitor.       "

## 2018-07-16 NOTE — PLAN OF CARE
Problem: Patient Care Overview  Goal: Plan of Care/Patient Progress Review  Outcome: Improving  Patient A&Ox4. VSS. No c/o abd pain/nausea this shift. BS hyperactive, passing flatus. Diet adv to clear liquid, tolerating well.  Right PIV infusing NS @ 100 mL/hr. Independent in room, steady.

## 2018-07-16 NOTE — PROGRESS NOTES
[facility]    Hospitalist Progress Note    Assessment & Plan   Saul Chavarria III is a 82 year old male with history of hypertension, chronic kidney disease stage II open radical prostatectomy and hernia surgery who presents with abdominal pain, nausea and vomiting for 1 day     Partial small bowel obstruction  -passing small amount of gas and stool, continue clear liquids for now.   If distended in AM will get abd xr    Hypertension  -PTA on lisinopril.    Blood pressure has been reasonable during hospital stay.  Continue PTA lisinopril     Leukocytosis  -Likely reactive in the setting of bowel obstruction.  No history of fever.  Leukocytosis seems to be improving. Monitor off antibiotics     Acute kidney injury on chronic kidney disease stage 2  -Likely prerenal from multiple episodes of vomiting.  Baseline creatinine around 1.1-1.2 looking at his prior records.  Admission creatinine was 1.34  -Improved with hydration, continue with gentle hydration, monitor, avoid nephrotoxins    Pain assessment :  Current Pain Score 7/16/2018   Patient currently in pain? denies   Pain score (0-10) -   Pain location -   Pain descriptors -   - Saul is experiencing pain due to bowel obstruction.  However patient did not have any pain today at the time of my evaluation. Pain management was discussed and the plan was created in a collaborative fashion.  Saul's response to the current recommendations: engaged  -Currently without any pain.  Oxycodone, IV Dilaudid and Tylenol as needed available    DVT Prophylaxis: Pneumatic Compression Devices and Ambulate every shift  Code Status: Full Code    Disposition: Expected discharge in 1 days if normal Gastrografin study and cleared by surgery once p.o. intake resumed.    Santana Asif MD  Text page (7am - 6pm)    Interval History   One small BM and limited flatus, still mildly distended.     -Data reviewed today: I reviewed all new labs and imaging results  over the last 24 hours. I personally reviewed no images or EKG's today.    Physical Exam   Temp: 98.1  F (36.7  C) Temp src: Oral BP: 106/66   Heart Rate: 77 Resp: 16 SpO2: 96 % O2 Device: None (Room air)    Vitals:    07/14/18 1221   Weight: 90.7 kg (200 lb)     Vital Signs with Ranges  Temp:  [96.6  F (35.9  C)-98.3  F (36.8  C)] 98.1  F (36.7  C)  Heart Rate:  [68-87] 77  Resp:  [16] 16  BP: (106-147)/(66-79) 106/66  SpO2:  [94 %-97 %] 96 %  I/O last 3 completed shifts:  In: 3121 [I.V.:3121]  Out: 1350 [Urine:450; Stool:900]    Exam:  Constitutional: Awake, alert and no distress. Appears comfortable  Cardiovascular: RRR.  No murmurs, no rub or gallop no JVD  Respiratory: Clear, no wheezes or crackles   Gastrointestinal: Abdomen soft, non-tender. BS present but still mildly increased pitch. Mildly distended.   : Deferred  Extremities: No edema , no clubbing /cyanosis   Neuro: alert, Ox3        Medications     sodium chloride 100 mL/hr at 07/16/18 1734       lisinopril (PRINIVIL/ZESTRIL) tablet 10 mg  10 mg Oral QPM       Data     Recent Labs  Lab 07/15/18  0805 07/14/18  1335   WBC 12.1* 14.7*   HGB 14.5 16.5   MCV 92 91    235    140   POTASSIUM 3.9 4.1   CHLORIDE 110* 104   CO2 24 29   BUN 33* 34*   CR 1.05 1.34*   ANIONGAP 9 7   KATY 7.8* 8.6   GLC 97 111*   ALBUMIN  --  3.8   PROTTOTAL  --  7.3   BILITOTAL  --  1.1   ALKPHOS  --  55   ALT  --  21   AST  --  21   LIPASE  --  74       Imaging:  No results found for this or any previous visit (from the past 24 hour(s)).

## 2018-07-17 ENCOUNTER — APPOINTMENT (OUTPATIENT)
Dept: GENERAL RADIOLOGY | Facility: CLINIC | Age: 83
DRG: 389 | End: 2018-07-17
Attending: INTERNAL MEDICINE
Payer: MEDICARE

## 2018-07-17 VITALS
DIASTOLIC BLOOD PRESSURE: 80 MMHG | OXYGEN SATURATION: 96 % | RESPIRATION RATE: 16 BRPM | HEIGHT: 71 IN | BODY MASS INDEX: 28 KG/M2 | TEMPERATURE: 97.7 F | WEIGHT: 200 LBS | SYSTOLIC BLOOD PRESSURE: 156 MMHG

## 2018-07-17 PROBLEM — Z90.79 S/P PROSTATECTOMY: Status: ACTIVE | Noted: 2018-07-17

## 2018-07-17 PROBLEM — Z87.19 S/P RIGHT INGUINAL HERNIA REPAIR: Status: ACTIVE | Noted: 2018-07-17

## 2018-07-17 PROBLEM — I10 ESSENTIAL HYPERTENSION: Status: ACTIVE | Noted: 2018-07-17

## 2018-07-17 PROBLEM — Z98.890 S/P RIGHT INGUINAL HERNIA REPAIR: Status: ACTIVE | Noted: 2018-07-17

## 2018-07-17 LAB
ANION GAP SERPL CALCULATED.3IONS-SCNC: 7 MMOL/L (ref 3–14)
BUN SERPL-MCNC: 18 MG/DL (ref 7–30)
CALCIUM SERPL-MCNC: 7.6 MG/DL (ref 8.5–10.1)
CHLORIDE SERPL-SCNC: 110 MMOL/L (ref 94–109)
CO2 SERPL-SCNC: 25 MMOL/L (ref 20–32)
CREAT SERPL-MCNC: 0.89 MG/DL (ref 0.66–1.25)
ERYTHROCYTE [DISTWIDTH] IN BLOOD BY AUTOMATED COUNT: 12.7 % (ref 10–15)
GFR SERPL CREATININE-BSD FRML MDRD: 82 ML/MIN/1.7M2
GLUCOSE SERPL-MCNC: 95 MG/DL (ref 70–99)
HCT VFR BLD AUTO: 38.6 % (ref 40–53)
HGB BLD-MCNC: 13 G/DL (ref 13.3–17.7)
MCH RBC QN AUTO: 30.5 PG (ref 26.5–33)
MCHC RBC AUTO-ENTMCNC: 33.7 G/DL (ref 31.5–36.5)
MCV RBC AUTO: 91 FL (ref 78–100)
PLATELET # BLD AUTO: 137 10E9/L (ref 150–450)
POTASSIUM SERPL-SCNC: 3.5 MMOL/L (ref 3.4–5.3)
RBC # BLD AUTO: 4.26 10E12/L (ref 4.4–5.9)
SODIUM SERPL-SCNC: 142 MMOL/L (ref 133–144)
WBC # BLD AUTO: 6.4 10E9/L (ref 4–11)

## 2018-07-17 PROCEDURE — 25000132 ZZH RX MED GY IP 250 OP 250 PS 637: Mod: GY | Performed by: INTERNAL MEDICINE

## 2018-07-17 PROCEDURE — 80048 BASIC METABOLIC PNL TOTAL CA: CPT | Performed by: INTERNAL MEDICINE

## 2018-07-17 PROCEDURE — 36415 COLL VENOUS BLD VENIPUNCTURE: CPT | Performed by: INTERNAL MEDICINE

## 2018-07-17 PROCEDURE — 99231 SBSQ HOSP IP/OBS SF/LOW 25: CPT | Performed by: PHYSICIAN ASSISTANT

## 2018-07-17 PROCEDURE — A9270 NON-COVERED ITEM OR SERVICE: HCPCS | Mod: GY | Performed by: INTERNAL MEDICINE

## 2018-07-17 PROCEDURE — 74018 RADEX ABDOMEN 1 VIEW: CPT

## 2018-07-17 PROCEDURE — 85027 COMPLETE CBC AUTOMATED: CPT | Performed by: INTERNAL MEDICINE

## 2018-07-17 PROCEDURE — 25000128 H RX IP 250 OP 636: Performed by: INTERNAL MEDICINE

## 2018-07-17 PROCEDURE — 99239 HOSP IP/OBS DSCHRG MGMT >30: CPT | Performed by: INTERNAL MEDICINE

## 2018-07-17 RX ADMIN — LISINOPRIL 10 MG: 10 TABLET ORAL at 20:08

## 2018-07-17 RX ADMIN — SODIUM CHLORIDE: 9 INJECTION, SOLUTION INTRAVENOUS at 06:19

## 2018-07-17 ASSESSMENT — ACTIVITIES OF DAILY LIVING (ADL)
ADLS_ACUITY_SCORE: 9

## 2018-07-17 NOTE — DISCHARGE SUMMARY
[facility]    Discharge Summary  Hospitalist    Date of Admission:  7/14/2018  Date of Discharge:  7/17/2018  Discharging Provider: Santana Asif MD    Discharge Diagnoses   Principal Problem:    Small bowel obstruction  Active Problems:    Essential hypertension    S/P right inguinal hernia repair 5/2017    S/P Radical prostatectomy-- 2006      History of Present Illness    82 year old male with history of hypertension, chronic kidney disease stage II open radical prostatectomy and hernia surgery who presents with abdominal pain, nausea and vomiting for 1 day.  Patient reports he was in his usual state of health until yesterday morning when he started noticing generalized abdominal pain.  It gradually got worse and he started noticing nausea and vomiting.  He went to emergency department at [place] yesterday where he had a CT abdomen and pelvis which showed evidence of partial bowel obstruction.  Patient has not had any bowel movement for the last 2 days he was recommended to have surgical evaluation however he refused to be admitted out of town so he was discharged.  He continued to feel worse at night with nausea vomiting and dry heaves and abdominal pain which was intermittent in nature.  He did mention that Tylenol would improve his pain a little bit.  When he did not get better he presented to the ED.  Patient reports he is currently feeling better since he got some pain medication.  He was offered an NG but he declined. His creatinine was 1.34 which is slightly increased as compared to his baseline of 1.1-1.2, WBC 14.7, normal lactate.  He was evaluated by general surgery in the ED who recommended conservative management.     Hospital Course   Treated with IV fluids, abdominal Xray on 7/15 showed multiple air/fluid levels, did pass gas and had a BM and abdominal pain resolved, but still had slightly increased pitch of bowels sounds.  Repeat xray on 7/17 showed much improvement, but  still an occasion air fluid level.  He was tolerating a low fiber diet at discharge, will follow up in 5 to 7 days.  Surgery was comfortable with discharge and he will follow up with Lina Tijerina MD  Pager: [phone]  Cell Phone:  [phone]       Significant Results and Procedures   As above    Pending Results   These results will be followed up by Dr. Whittaker  Unresulted Labs Ordered in the Past 30 Days of this Admission     No orders found from 5/15/2018 to 7/15/2018.          Code Status   Full Code       Primary Care Physician   Lina Tijerian MD    Physical Exam   Temp: 97.7  F (36.5  C) Temp src: Oral BP: 146/80   Heart Rate: 71 Resp: 16 SpO2: 96 % O2 Device: None (Room air)    Vitals:    07/14/18 1221   Weight: 90.7 kg (200 lb)     Vital Signs with Ranges  Temp:  [97.3  F (36.3  C)-98.7  F (37.1  C)] 97.7  F (36.5  C)  Heart Rate:  [66-79] 71  Resp:  [14-16] 16  BP: (100-146)/(48-80) 146/80  SpO2:  [95 %-98 %] 96 %  I/O last 3 completed shifts:  In: 1707 [P.O.:500; I.V.:1207]  Out: 1600 [Urine:1600]    Exam on discharge:   Abdomen soft and non-tender.  Bowel sounds present with slight increased pitch.      # Discharge Pain Plan:   - Patient currently has NO PAIN and is not being prescribed pain medications on discharge.      Discharge Disposition   Discharged to home  Condition at discharge: Good    Consultations This Hospital Stay   SURGERY GENERAL IP CONSULT    Time Spent on this Encounter   I spent a total of 35 minutes discharging this patient.     Discharge Orders     Reason for your hospital stay   Small bowel obstruction     Follow-up and recommended labs and tests    Follow up with primary care provider, Lina Tijerina MD, in 5 to 7 days for hospital follow- up.  No follow up labs or test are needed.     Activity   Your activity upon discharge: activity as tolerated     Discharge Instructions   Call Dr. Whittaker at Pager [phone] if questions, or  Cell Phone [phone].     Full Code     Diet   Follow this diet upon discharge: Orders Placed This Encounter     Low Fiber Diet -- for 1 week (avoid raw fruits and vegetables), then resume regular diet after that.       Discharge Medications   Current Discharge Medication List      CONTINUE these medications which have NOT CHANGED    Details   ACETAMINOPHEN PO Take 325 mg by mouth every 6 hours as needed for pain      LISINOPRIL PO Take 10 mg by mouth daily           Allergies   No Known Allergies  Data   Most Recent 3 CBC's:  Recent Labs   Lab Test  07/17/18   0715  07/15/18   0805 07/14/18   1335   WBC  6.4  12.1*  14.7*   HGB  13.0*  14.5  16.5   MCV  91  92  91   PLT  137*  202  235      Most Recent 3 BMP's:  Recent Labs   Lab Test  07/17/18   0715  07/15/18   0805 07/14/18   1335   NA  142  143  140   POTASSIUM  3.5  3.9  4.1   CHLORIDE  110*  110*  104   CO2  25  24  29   BUN  18  33*  34*   CR  0.89  1.05  1.34*   ANIONGAP  7  9  7   KATY  7.6*  7.8*  8.6   GLC  95  97  111*     Most Recent 2 LFT's:  Recent Labs   Lab Test  07/14/18   1335   AST  21   ALT  21   ALKPHOS  55   BILITOTAL  1.1     Most Recent INR's and Anticoagulation Dosing History:  Anticoagulation Dose History     There is no flowsheet data to display.        Most Recent 3 Troponin's:No lab results found.  Most Recent Cholesterol Panel:No lab results found.  Most Recent 6 Bacteria Isolates From Any Culture (See EPIC Reports for Culture Details):No lab results found.  Most Recent TSH, T4 and A1c Labs:No lab results found.

## 2018-07-17 NOTE — PROVIDER NOTIFICATION
MD Notification    Notified Person: MD    Notified Person Name: Dr Whittaker    Notification Date/Time: 7/17/18 8490    Notification Interaction: Spoke over phone    Purpose of Notification: Just clarifying terms of discharge. And updating Md with results of xray (read to him over phone)    Orders Received: confirmed xray results. Pt should trial low fiber diet; if tolerates with no nausea or vomiting can go home around 1830    Comments:

## 2018-07-17 NOTE — PLAN OF CARE
Problem: Bowel Obstruction (Adult)  Goal: Signs and Symptoms of Listed Potential Problems Will be Absent, Minimized or Managed (Bowel Obstruction)  Signs and symptoms of listed potential problems will be absent, minimized or managed by discharge/transition of care (reference Bowel Obstruction (Adult) CPG).   Outcome: Improving  Patient is A&Ox4. VSS. Denies pain. Up independently to bathroom, uses urinal. Tolerating clear liquid diet. R PIV infusing NS at 75 cc/hr. Slept between cares. Calls appropriately.

## 2018-07-17 NOTE — PLAN OF CARE
Problem: Patient Care Overview  Goal: Plan of Care/Patient Progress Review  Outcome: No Change  Patient A&Ox4. VSS. No c/o abd pain/nausea this shift. BS faint on right side, passing flatus, one stool this shift. Diet adv to full liquid, tolerating well.  Right PIV infusing NS @ 75 mL/hr. Independent in room, steady.

## 2018-07-17 NOTE — PROGRESS NOTES
"Surgery    No complaints.   Tolerating full liquids  Hungry for more.   No abd pain.   Walking routinely.   + bm's    Gen:  Awake, Alert, NAD, pleasant, conversational.  Blood pressure 142/77, temperature 97.3  F (36.3  C), temperature source Oral, resp. rate 14, height 1.803 m (5' 11\"), weight 90.7 kg (200 lb), SpO2 98 %.  Resp - non labored.  Abdomen - soft, non tender, non distended.  Extremities - no lower extremity edema.    Hgb 13.0   Wbc 6.4    A/P bowel obstruction - resolved     - doing well.  - no need for further imaging.   - discussed slow advancement of diet in stepwise fashion.   - home today ok.  - no scheduled follow up needed with Surgery Team.   - will sign off.    Eduardo Quigley PA-C  Office: [phone]  Pager: [phone]        "

## 2018-07-17 NOTE — PLAN OF CARE
Problem: Patient Care Overview  Goal: Plan of Care/Patient Progress Review   Pt alert and oriented. Up ind. Passing gas, small BM today. Bowel sounds active. Abdomen soft, rounded. Tolerating clears. Denies pain and nausea. NS infusing at 75/hr. Will continue to monitor.

## 2018-07-18 NOTE — PROGRESS NOTES
Discharge to home. VSS. No pain or nausea. Tolerating low fiber diet. Passing some gas. Small stool earlier today. Xray results read to Dr Whittaker prior to d/c. Went over all d/c instructions with patient; provided handouts and education on low fiber diet. All questions answered. PIV removed. Belongings sent with patient. Pt left unit with RN at 2055 to meet son at car to d/c home.

## 2019-01-07 NOTE — ANESTHESIA PREPROCEDURE EVALUATION
Little red bump   Procedure: Procedure(s):  PHACOEMULSIFICATION CLEAR CORNEA WITH STANDARD INTRAOCULAR LENS IMPLANT  Preop diagnosis: CATARACT    No Known Allergies  Past Medical History:   Diagnosis Date     Anemia      Hypertension      Prostate cancer (H)      Rosacea      Sleep apnea     uses CPAP     Past Surgical History:   Procedure Laterality Date     COLONOSCOPY       GENITOURINARY SURGERY      kidney stone extraction     PROSTATECTOMY PERINEAL       Prior to Admission medications    Medication Sig Start Date End Date Taking? Authorizing Provider   LISINOPRIL PO Take 10 mg by mouth daily   Yes Reported, Patient     Current Facility-Administered Medications Ordered in Epic   Medication Dose Route Frequency Last Rate Last Dose     proparacaine (ALCAINE) 0.5 % ophthalmic solution 1 drop  1 drop Ophthalmic Once         lidocaine (AKTEN) ophthalmic gel 0.5 mL  0.5 mL Ophthalmic Once         povidone-iodine 5 % ophthalmic solution 1 drop  1 drop Ophthalmic Once         lidocaine 1 % 1 mL  1 mL Other Q1H PRN   1 mL at 04/25/17 0621     lactated ringers infusion  500 mL Intravenous Continuous 25 mL/hr at 04/25/17 0622 500 mL at 04/25/17 0622     No current [facility]-ordered outpatient prescriptions on file.     Wt Readings from Last 1 Encounters:   No data found for Wt     Temp Readings from Last 1 Encounters:   04/25/17 36.9  C (98.5  F) (Temporal)     BP Readings from Last 6 Encounters:   04/25/17 143/85     Pulse Readings from Last 4 Encounters:   No data found for Pulse     Resp Readings from Last 1 Encounters:   04/25/17 16     SpO2 Readings from Last 1 Encounters:   04/25/17 99%        Anesthesia Evaluation     . Pt has had prior anesthetic. Type: General    No history of anesthetic complications          ROS/MED HX    ENT/Pulmonary:     (+)sleep apnea, uses CPAP , . .   (-) asthma and COPD   Neurologic:      (-) seizures and CVA   Cardiovascular:     (+) hypertension----. : . . . :. .       METS/Exercise Tolerance:      Hematologic:     (+) Anemia, -      Musculoskeletal:         GI/Hepatic:        (-) GERD and liver disease   Renal/Genitourinary:     (+) Nephrolithiasis ,    (-) renal disease   Endo:     (+) Obesity, .      Psychiatric:         Infectious Disease:         Malignancy:   (+) Malignancy History of Prostate          Other:                     Physical Exam  Normal systems: dental    Airway   Mallampati: III  TM distance: >3 FB  Neck ROM: full    Dental   (+) caps    Cardiovascular       Pulmonary                     Anesthesia Plan      History & Physical Review  History and physical reviewed and following examination; no interval change.    ASA Status:  2 .    NPO Status:  > 8 hours    Plan for MAC Reason for MAC:  Procedure to face, neck, head or breast         Postoperative Care      Consents  Anesthetic plan, risks, benefits and alternatives discussed with:  Patient..                          .

## 2019-04-12 NOTE — ANESTHESIA POSTPROCEDURE EVALUATION
Patient: Saul Chavarria III    Procedure(s):  RIGHT EYE PHACOEMULSIFICATION CLEAR CORNEA WITH STANDARD INTRAOCULAR LENS IMPLANT - Wound Class: I-Clean    Diagnosis:CATARACT  Diagnosis Additional Information: No value filed.    Anesthesia Type:  MAC    Note:  Anesthesia Post Evaluation    Patient location during evaluation: PACU  Patient participation: Able to fully participate in evaluation  Level of consciousness: awake and awake and alert  Pain management: adequate  Airway patency: patent  Cardiovascular status: acceptable  Respiratory status: acceptable  Hydration status: acceptable  PONV: none     Anesthetic complications: None          Last vitals:  Vitals:    04/25/17 0615 04/25/17 0805 04/25/17 0820   BP: 143/85 127/85 105/75   Resp: 16 16 16   Temp: 36.9  C (98.5  F)     SpO2: 99% 98% 98%         Electronically Signed By: Katie Zapien  April 25, 2017  2:44 PM   Well positioned.

## (undated) DEVICE — GOWN IMPERVIOUS SPECIALTY XLG/XLONG 32474

## (undated) DEVICE — SU VICRYL 2-0 SH 27" J317H

## (undated) DEVICE — PREP CHLORAPREP 26ML TINTED ORANGE  260815

## (undated) DEVICE — SU VICRYL 2-0 TIE 12X18" J905T

## (undated) DEVICE — LINEN TOWEL PACK X5 5464

## (undated) DEVICE — GLOVE PROTEXIS W/NEU-THERA 8.0  2D73TE80

## (undated) DEVICE — SU VICRYL 0 CT-1 27" J340H

## (undated) DEVICE — EYE KNIFE SLIT XSTAR VISITEC 2.5MM 45DEG BEVEL UP 373725

## (undated) DEVICE — EYE TIP IRRIGATION & ASPIRATION POLYMER 35D BENT 8065751511

## (undated) DEVICE — ESU ELEC BLADE 2.75" COATED/INSULATED E1455

## (undated) DEVICE — GLOVE PROTEXIS MICRO 7.0  2D73PM70

## (undated) DEVICE — DRAIN PENROSE 0.25"X18" LATEX FREE GR201

## (undated) DEVICE — EYE SOL BSS 500ML

## (undated) DEVICE — SOL NACL 0.9% IRRIG 1000ML BOTTLE 07138-09

## (undated) DEVICE — SU MONOCRYL 4-0 PS-2 18" UND Y496G

## (undated) DEVICE — SUCTION TIP YANKAUER W/O VENT K86

## (undated) DEVICE — PACK MINOR SBA15MIFSE

## (undated) DEVICE — BLADE CLIPPER 4406

## (undated) DEVICE — PACK CATARACT CUSTOM SO DALE SEY32CTFCX

## (undated) DEVICE — EYE SHIELD PLASTIC

## (undated) DEVICE — EYE DRSG PAD OVAL

## (undated) DEVICE — GLOVE PROTEXIS MICRO 6.5  2D73PM65

## (undated) DEVICE — EYE PACK BVI READYPAK KIT #1

## (undated) DEVICE — DRAPE LAP W/ARMBOARD 29410

## (undated) DEVICE — DRSG STERI STRIP 1/2X4" R1547

## (undated) DEVICE — SEALANT OCULAR RESURE RS-1004-US-10

## (undated) DEVICE — SYR 10ML SLIP TIP W/O NDL

## (undated) DEVICE — EYE PACK CUSTOM ANTERIOR 30DEG TIP CENTURION PPK6682-04

## (undated) DEVICE — SU VICRYL 3-0 SH 27" J316H

## (undated) RX ORDER — FENTANYL CITRATE 50 UG/ML
INJECTION, SOLUTION INTRAMUSCULAR; INTRAVENOUS
Status: DISPENSED
Start: 2017-04-25

## (undated) RX ORDER — CEFAZOLIN SODIUM 2 G/100ML
INJECTION, SOLUTION INTRAVENOUS
Status: DISPENSED
Start: 2017-06-27

## (undated) RX ORDER — PROPOFOL 10 MG/ML
INJECTION, EMULSION INTRAVENOUS
Status: DISPENSED
Start: 2017-06-27

## (undated) RX ORDER — MOXIFLOXACIN 5 MG/ML
SOLUTION/ DROPS OPHTHALMIC
Status: DISPENSED
Start: 2017-04-25

## (undated) RX ORDER — BUPIVACAINE HYDROCHLORIDE AND EPINEPHRINE 5; 5 MG/ML; UG/ML
INJECTION, SOLUTION EPIDURAL; INTRACAUDAL; PERINEURAL
Status: DISPENSED
Start: 2017-06-27

## (undated) RX ORDER — LIDOCAINE HYDROCHLORIDE 10 MG/ML
INJECTION, SOLUTION EPIDURAL; INFILTRATION; INTRACAUDAL; PERINEURAL
Status: DISPENSED
Start: 2017-04-25

## (undated) RX ORDER — ONDANSETRON 2 MG/ML
INJECTION INTRAMUSCULAR; INTRAVENOUS
Status: DISPENSED
Start: 2017-04-25

## (undated) RX ORDER — ONDANSETRON 2 MG/ML
INJECTION INTRAMUSCULAR; INTRAVENOUS
Status: DISPENSED
Start: 2017-06-27

## (undated) RX ORDER — NEOMYCIN SULFATE, POLYMYXIN B SULFATE, AND DEXAMETHASONE 3.5; 10000; 1 MG/G; [USP'U]/G; MG/G
OINTMENT OPHTHALMIC
Status: DISPENSED
Start: 2017-04-25

## (undated) RX ORDER — FENTANYL CITRATE 50 UG/ML
INJECTION, SOLUTION INTRAMUSCULAR; INTRAVENOUS
Status: DISPENSED
Start: 2017-06-27

## (undated) RX ORDER — LIDOCAINE HYDROCHLORIDE 20 MG/ML
INJECTION, SOLUTION EPIDURAL; INFILTRATION; INTRACAUDAL; PERINEURAL
Status: DISPENSED
Start: 2017-06-27